# Patient Record
Sex: MALE | Race: WHITE | NOT HISPANIC OR LATINO | Employment: OTHER | URBAN - METROPOLITAN AREA
[De-identification: names, ages, dates, MRNs, and addresses within clinical notes are randomized per-mention and may not be internally consistent; named-entity substitution may affect disease eponyms.]

---

## 2023-03-05 LAB — CALPROTECTIN, STOOL: 45 UG/G

## 2023-08-16 ENCOUNTER — HOSPITAL ENCOUNTER (OUTPATIENT)
Dept: DATA CONVERSION | Facility: HOSPITAL | Age: 68
End: 2023-08-16
Attending: INTERNAL MEDICINE | Admitting: INTERNAL MEDICINE
Payer: MEDICARE

## 2023-08-16 DIAGNOSIS — K64.0 FIRST DEGREE HEMORRHOIDS: ICD-10-CM

## 2023-08-16 DIAGNOSIS — K57.30 DIVERTICULOSIS OF LARGE INTESTINE WITHOUT PERFORATION OR ABSCESS WITHOUT BLEEDING: ICD-10-CM

## 2023-08-16 DIAGNOSIS — K51.30 ULCERATIVE (CHRONIC) RECTOSIGMOIDITIS WITHOUT COMPLICATIONS (MULTI): ICD-10-CM

## 2023-08-16 DIAGNOSIS — Z12.11 ENCOUNTER FOR SCREENING FOR MALIGNANT NEOPLASM OF COLON: ICD-10-CM

## 2023-08-16 DIAGNOSIS — K64.4 RESIDUAL HEMORRHOIDAL SKIN TAGS: ICD-10-CM

## 2023-08-24 LAB
COMPLETE PATHOLOGY REPORT: NORMAL
CONVERTED CLINICAL DIAGNOSIS-HISTORY: NORMAL
CONVERTED FINAL DIAGNOSIS: NORMAL
CONVERTED FINAL REPORT PDF LINK TO COPY AND PASTE: NORMAL
CONVERTED GROSS DESCRIPTION: NORMAL

## 2023-09-29 VITALS
DIASTOLIC BLOOD PRESSURE: 89 MMHG | TEMPERATURE: 96.8 F | RESPIRATION RATE: 18 BRPM | HEART RATE: 71 BPM | SYSTOLIC BLOOD PRESSURE: 138 MMHG

## 2023-09-30 NOTE — H&P
History of Present Illness:   History Present Illness:  Reason for surgery: Ulcerative rectosigmoiditis   HPI:    Patient with known case of ulcerative proctosigmoiditis scheduled for colonoscopy to confirm healing.  Colonoscopy in 2019-left-sided colitis, biopsy chronic active colitis.  Colonoscopy in 2021 colitis involving rectum to sigmoid, biopsy chronic active colitis.  He is on mesalamine      Allergies:        Allergies:  ·  No Known Allergies :     Home Medication Review:   Home Medications Reviewed: yes     Impression/Procedure:   ·  Impression and Planned Procedure: Colonoscopy with biopsy       ERAS (Enhanced Recovery After Surgery):  ·  ERAS Patient: no       Vital Signs:  Temperature C: 36 degrees C   Temperature F: 96.8 degrees F   Heart Rate: 71 beats per minute   Respiratory Rate: 18 breath per minute   Blood Pressure Systolic: 138 mm/Hg   Blood Pressure Diastolic: 89 mm/Hg     Physical Exam by System:    Respiratory/Thorax: Patent airways, CTAB, normal  breath sounds with good chest expansion, thorax symmetric   Cardiovascular: Regular, rate and rhythm, no murmurs,  2+ equal pulses of the extremities, normal S 1and S 2     Consent:   COVID-19 Consent:  ·  COVID-19 Risk Consent Surgeon has reviewed key risks related to the risk of lyndon COVID-19 and if they contract COVID-19 what the risks are.       Electronic Signatures:  Nanette Starkey)  (Signed 16-Aug-2023 08:55)   Authored: History of Present Illness, Allergies, Home  Medication Review, Impression/Procedure, ERAS, Physical Exam, Consent, Note Completion      Last Updated: 16-Aug-2023 08:55 by Nanette Starkey)

## 2023-12-08 PROBLEM — M79.672 ACUTE FOOT PAIN, LEFT: Status: ACTIVE | Noted: 2023-12-08

## 2023-12-08 PROBLEM — K52.9 COLITIS WITHOUT COMPLICATION: Status: ACTIVE | Noted: 2023-12-08

## 2023-12-08 PROBLEM — K62.5 RECTAL HEMORRHAGE: Status: ACTIVE | Noted: 2023-12-08

## 2023-12-08 PROBLEM — M72.2 PLANTAR FASCIITIS: Status: ACTIVE | Noted: 2023-12-08

## 2023-12-08 PROBLEM — R10.9 ABDOMINAL PAIN: Status: ACTIVE | Noted: 2023-12-08

## 2023-12-08 PROBLEM — N40.0 BPH (BENIGN PROSTATIC HYPERPLASIA): Status: ACTIVE | Noted: 2023-12-08

## 2023-12-08 PROBLEM — K51.311 ULCERATIVE RECTOSIGMOIDITIS WITH RECTAL BLEEDING (MULTI): Status: ACTIVE | Noted: 2023-12-08

## 2023-12-08 RX ORDER — PANTOPRAZOLE SODIUM 40 MG/1
1 TABLET, DELAYED RELEASE ORAL DAILY
COMMUNITY

## 2023-12-08 RX ORDER — MESALAMINE 1.2 G/1
3600 TABLET, DELAYED RELEASE ORAL DAILY
COMMUNITY
Start: 2021-07-09 | End: 2024-04-09

## 2023-12-08 RX ORDER — NAPROXEN 250 MG/1
250 TABLET ORAL 2 TIMES DAILY
Status: ON HOLD | COMMUNITY
Start: 2023-06-13 | End: 2024-02-10 | Stop reason: WASHOUT

## 2023-12-08 RX ORDER — TAMSULOSIN HYDROCHLORIDE 0.4 MG/1
1 CAPSULE ORAL DAILY
COMMUNITY
Start: 2019-05-25

## 2023-12-11 ENCOUNTER — OFFICE VISIT (OUTPATIENT)
Dept: PAIN MEDICINE | Facility: CLINIC | Age: 68
End: 2023-12-11
Payer: MEDICARE

## 2023-12-11 ENCOUNTER — ANCILLARY PROCEDURE (OUTPATIENT)
Dept: RADIOLOGY | Facility: CLINIC | Age: 68
End: 2023-12-11
Payer: MEDICARE

## 2023-12-11 VITALS — SYSTOLIC BLOOD PRESSURE: 157 MMHG | DIASTOLIC BLOOD PRESSURE: 60 MMHG | RESPIRATION RATE: 20 BRPM | HEART RATE: 80 BPM

## 2023-12-11 DIAGNOSIS — M25.561 CHRONIC PAIN OF BOTH KNEES: ICD-10-CM

## 2023-12-11 DIAGNOSIS — G89.29 CHRONIC PAIN OF BOTH KNEES: ICD-10-CM

## 2023-12-11 DIAGNOSIS — M25.511 RIGHT SHOULDER PAIN, UNSPECIFIED CHRONICITY: ICD-10-CM

## 2023-12-11 DIAGNOSIS — M25.562 CHRONIC PAIN OF BOTH KNEES: ICD-10-CM

## 2023-12-11 PROCEDURE — 73562 X-RAY EXAM OF KNEE 3: CPT | Mod: BILATERAL PROCEDURE | Performed by: RADIOLOGY

## 2023-12-11 PROCEDURE — 73030 X-RAY EXAM OF SHOULDER: CPT | Mod: RIGHT SIDE | Performed by: RADIOLOGY

## 2023-12-11 PROCEDURE — 1036F TOBACCO NON-USER: CPT | Performed by: ANESTHESIOLOGY

## 2023-12-11 PROCEDURE — 73562 X-RAY EXAM OF KNEE 3: CPT | Mod: 50

## 2023-12-11 PROCEDURE — 99203 OFFICE O/P NEW LOW 30 MIN: CPT | Performed by: ANESTHESIOLOGY

## 2023-12-11 PROCEDURE — 99213 OFFICE O/P EST LOW 20 MIN: CPT | Performed by: ANESTHESIOLOGY

## 2023-12-11 PROCEDURE — 1159F MED LIST DOCD IN RCRD: CPT | Performed by: ANESTHESIOLOGY

## 2023-12-11 PROCEDURE — 1125F AMNT PAIN NOTED PAIN PRSNT: CPT | Performed by: ANESTHESIOLOGY

## 2023-12-11 PROCEDURE — 73030 X-RAY EXAM OF SHOULDER: CPT | Mod: RT,FY

## 2023-12-11 RX ORDER — DEXTROMETHORPHAN HYDROBROMIDE, GUAIFENESIN 5; 100 MG/5ML; MG/5ML
1300 LIQUID ORAL 2 TIMES DAILY PRN
Qty: 120 TABLET | Refills: 2 | Status: SHIPPED | OUTPATIENT
Start: 2023-12-11 | End: 2024-04-09

## 2023-12-11 RX ORDER — DICLOFENAC SODIUM 10 MG/G
4 GEL TOPICAL 4 TIMES DAILY
Qty: 200 G | Refills: 2 | Status: SHIPPED | OUTPATIENT
Start: 2023-12-11

## 2023-12-11 ASSESSMENT — PAIN DESCRIPTION - DESCRIPTORS: DESCRIPTORS: ACHING

## 2023-12-11 ASSESSMENT — ENCOUNTER SYMPTOMS
LOSS OF SENSATION IN FEET: 0
DEPRESSION: 0
OCCASIONAL FEELINGS OF UNSTEADINESS: 0

## 2023-12-11 ASSESSMENT — PAIN - FUNCTIONAL ASSESSMENT: PAIN_FUNCTIONAL_ASSESSMENT: 0-10

## 2023-12-11 ASSESSMENT — PAIN SCALES - GENERAL: PAINLEVEL_OUTOF10: 5 - MODERATE PAIN

## 2023-12-11 NOTE — PROGRESS NOTES
History Of Present Illness  Shakir Arciniega is a 68 y.o. male presenting with bilateral knee pain.  Pain is localized to the medial aspect of the knees and the patient describes it as sharp stabbing in nature.  Factors that x-rayed the pain include standing, walking and going upstairs.  Pain is relieved with rest.  Patient has not had any x-rays of his knees.  Patient has history of ulcerative colitis and is not able to take any nonsteroidals.  Imaging patient is complaining of right shoulder pain secondary to a fall 1 year ago.  Pain localized to the tear aspect of the shoulder and is exacerbated by abduction and elevation.  No x-rays of shoulder available.  Patient is not involved in any physical therapy.  No history of any interventions.     Past Medical History  He has a past medical history of Ulcerative colitis (CMS/[facility]).    Surgical History  He has a past surgical history that includes Knee surgery.     Social History  He reports that he has never smoked. He has never used smokeless tobacco. No history on file for alcohol use and drug use.    Family History  No family history on file.     Allergies  Patient has no known allergies.    Review of systems:   13-point review of systems done and negative except as noted in HPI      Physical Exam   Pertinent findings:   Knees: No swelling or deformity noted.  No erythema.  Normal range of motion with cracking on flexion extension  Negative drawer sign.  Right shoulder: Tenderness on palpation of acromioclavicular joint with deformity.  Normal range of motion    Vital signs: Reviewed  MSK:   No asymmetry or masses noted of the musculature.   Examination of the muscles/joints/bones show normal range of motion.  The patient is mildly tender to palpation in the cervical and lumbar paraspinal musculature.      Neurologic:  Motor strength:   5/5 muscle strength of the upper extremities bilateral and equal.  5/5 muscle strength of the lower extremities bilaterally and equal.      Sensation:   Sensation intact to pin prick in the bilateral upper extremities.  Sensation intact to pin prick in the bilateral lower extremities.     Provocative tests: Negative Diaz sign bilaterally, 2+ patellar reflexes bilaterally.    Psychiatric: Mood and affect are normal.     Last Recorded Vitals  /60   Pulse 80   Resp 20     Relevant Results            Last OARRS Review: No data recorded    I have personally reviewed the OARRS report for Shakir Arciniega I have considered the risks of abuse, dependence, addiction and diversion       Assessment/Plan physical therapy thanks  Diagnoses and all orders for this visit:  Chronic pain of both knees  Right shoulder pain, unspecified chronicity  Mr. Arciniega was evaluated at the pain clinic at [facility] due to to his bilateral knee pain.  Upon evaluation examination of the patient that he is suffering from osteoarthritis of the knees.  I also feel that he has a acromioclavicular deformity secondary to his fall.  I discussed with the patient the etiology of his pain and the options for treatment.  I discussed doing x-rays of the knees and shoulder and schedule for intra-articular steroid injections.  Risk and benefits were discussed and patient was agreeable with the plan.    Plan  Voltaren gel to be applied to both knees  Tylenol arthritis 2 tablets twice daily  Refer patient for physical therapy for quadriceps muscle strengthening  Schedule patient for intra-articular steroid injection for both knees.         Jose Rodriguez MD

## 2023-12-19 ENCOUNTER — TELEPHONE (OUTPATIENT)
Dept: PAIN MEDICINE | Facility: CLINIC | Age: 68
End: 2023-12-19
Payer: MEDICARE

## 2023-12-19 NOTE — TELEPHONE ENCOUNTER
The doctor ordered P.T. for the patient but he states it is too far to drive. Please call and advise. Thanks

## 2024-01-18 ENCOUNTER — LAB (OUTPATIENT)
Dept: LAB | Facility: LAB | Age: 69
End: 2024-01-18
Payer: MEDICARE

## 2024-01-18 DIAGNOSIS — N40.1 BENIGN PROSTATIC HYPERPLASIA WITH LOWER URINARY TRACT SYMPTOMS: Primary | ICD-10-CM

## 2024-01-18 PROCEDURE — 84154 ASSAY OF PSA FREE: CPT

## 2024-01-18 PROCEDURE — 84153 ASSAY OF PSA TOTAL: CPT

## 2024-01-18 PROCEDURE — 36415 COLL VENOUS BLD VENIPUNCTURE: CPT

## 2024-01-19 LAB — PSA SERPL-MCNC: 3.86 NG/ML

## 2024-01-21 LAB
PSA FREE MFR SERPL: 20 %
PSA FREE SERPL-MCNC: 0.9 NG/ML
PSA SERPL IA-MCNC: 4.6 NG/ML (ref 0–4)

## 2024-01-29 ENCOUNTER — OFFICE VISIT (OUTPATIENT)
Dept: PAIN MEDICINE | Facility: CLINIC | Age: 69
End: 2024-01-29
Payer: MEDICARE

## 2024-01-29 VITALS — DIASTOLIC BLOOD PRESSURE: 76 MMHG | HEART RATE: 85 BPM | RESPIRATION RATE: 20 BRPM | SYSTOLIC BLOOD PRESSURE: 148 MMHG

## 2024-01-29 DIAGNOSIS — M25.562 CHRONIC PAIN OF LEFT KNEE: Primary | ICD-10-CM

## 2024-01-29 DIAGNOSIS — G89.29 CHRONIC PAIN OF LEFT KNEE: Primary | ICD-10-CM

## 2024-01-29 PROCEDURE — 1036F TOBACCO NON-USER: CPT | Performed by: ANESTHESIOLOGY

## 2024-01-29 PROCEDURE — 20610 DRAIN/INJ JOINT/BURSA W/O US: CPT | Performed by: ANESTHESIOLOGY

## 2024-01-29 PROCEDURE — 1125F AMNT PAIN NOTED PAIN PRSNT: CPT | Performed by: ANESTHESIOLOGY

## 2024-01-29 PROCEDURE — 1159F MED LIST DOCD IN RCRD: CPT | Performed by: ANESTHESIOLOGY

## 2024-01-29 ASSESSMENT — ENCOUNTER SYMPTOMS
DEPRESSION: 0
OCCASIONAL FEELINGS OF UNSTEADINESS: 0
LOSS OF SENSATION IN FEET: 0

## 2024-01-29 ASSESSMENT — PAIN SCALES - GENERAL: PAINLEVEL_OUTOF10: 9

## 2024-01-29 ASSESSMENT — PAIN - FUNCTIONAL ASSESSMENT: PAIN_FUNCTIONAL_ASSESSMENT: 0-10

## 2024-01-29 ASSESSMENT — PAIN DESCRIPTION - DESCRIPTORS: DESCRIPTORS: ACHING

## 2024-01-29 NOTE — PROGRESS NOTES
Patient ID: Shakir Arciniega is a 68 y.o. male.    Joint Injection/Aspiration    Date/Time: 1/29/2024 1:37 PM    Performed by: Jose Rodriguez MD  Authorized by: Jose Rodriguez MD    Consent:     Consent obtained:  Written    Consent given by:  Patient    Risks, benefits, and alternatives were discussed: yes      Risks discussed:  Bleeding, infection, pain and nerve damage  Universal protocol:     Procedure explained and questions answered to patient or proxy's satisfaction: yes      Relevant documents present and verified: yes      Test results available: yes      Imaging studies available: yes      Required blood products, implants, devices, and special equipment available: yes      Site/side marked: yes      Immediately prior to procedure, a time out was called: yes      Patient identity confirmed:  Verbally with patient  Location:     Location:  Knee  Anesthesia:     Anesthesia method:  Topical application  Procedure details:     Preparation: Patient was prepped and draped in usual sterile fashion      Needle gauge:  22 G    Ultrasound guidance: no      Approach:  Lateral    Steroid injected: yes      Specimen collected: no    Post-procedure details:     Dressing:  Adhesive bandage    Procedure completion:  Tolerated  Comments:      With the patient sitting position the left knee was prepped and draped in sterile fashion.  Using landmark technique and a lateral to medial approach a 25-gauge 1/2 inch needle was introduced into the intra-articular space.  Bupivacaine 0.25% mixed with triamcinolone 40 mg a total of 5 cc was injected.  Patient tolerated the procedure without problem and was discharged home.

## 2024-02-09 ENCOUNTER — APPOINTMENT (OUTPATIENT)
Dept: RADIOLOGY | Facility: HOSPITAL | Age: 69
End: 2024-02-09
Payer: MEDICARE

## 2024-02-09 ENCOUNTER — HOSPITAL ENCOUNTER (EMERGENCY)
Facility: HOSPITAL | Age: 69
Discharge: OTHER INSTITUTION | End: 2024-02-10
Attending: EMERGENCY MEDICINE
Payer: MEDICARE

## 2024-02-09 ENCOUNTER — APPOINTMENT (OUTPATIENT)
Dept: CARDIOLOGY | Facility: HOSPITAL | Age: 69
End: 2024-02-09
Payer: MEDICARE

## 2024-02-09 DIAGNOSIS — K92.2 GASTROINTESTINAL HEMORRHAGE, UNSPECIFIED GASTROINTESTINAL HEMORRHAGE TYPE: Primary | ICD-10-CM

## 2024-02-09 DIAGNOSIS — K51.919 ULCERATIVE COLITIS WITH COMPLICATION, UNSPECIFIED LOCATION (MULTI): ICD-10-CM

## 2024-02-09 LAB
ABO GROUP (TYPE) IN BLOOD: NORMAL
ALBUMIN SERPL BCP-MCNC: 4 G/DL (ref 3.4–5)
ALP SERPL-CCNC: 47 U/L (ref 33–136)
ALT SERPL W P-5'-P-CCNC: 19 U/L (ref 10–52)
ANION GAP BLDV CALCULATED.4IONS-SCNC: 7 MMOL/L (ref 10–25)
ANION GAP SERPL CALC-SCNC: 12 MMOL/L (ref 10–20)
ANTIBODY SCREEN: NORMAL
APTT PPP: 31 SECONDS (ref 27–38)
AST SERPL W P-5'-P-CCNC: 16 U/L (ref 9–39)
BASE EXCESS BLDV CALC-SCNC: 2.8 MMOL/L (ref -2–3)
BASOPHILS # BLD AUTO: 0.05 X10*3/UL (ref 0–0.1)
BASOPHILS NFR BLD AUTO: 0.6 %
BILIRUB SERPL-MCNC: 0.6 MG/DL (ref 0–1.2)
BODY TEMPERATURE: ABNORMAL
BUN SERPL-MCNC: 14 MG/DL (ref 6–23)
CA-I BLDV-SCNC: 1.14 MMOL/L (ref 1.1–1.33)
CALCIUM SERPL-MCNC: 8.6 MG/DL (ref 8.6–10.3)
CHLORIDE BLDV-SCNC: 109 MMOL/L (ref 98–107)
CHLORIDE SERPL-SCNC: 106 MMOL/L (ref 98–107)
CO2 SERPL-SCNC: 27 MMOL/L (ref 21–32)
CREAT SERPL-MCNC: 0.89 MG/DL (ref 0.5–1.3)
EGFRCR SERPLBLD CKD-EPI 2021: >90 ML/MIN/1.73M*2
EOSINOPHIL # BLD AUTO: 0.05 X10*3/UL (ref 0–0.7)
EOSINOPHIL NFR BLD AUTO: 0.6 %
ERYTHROCYTE [DISTWIDTH] IN BLOOD BY AUTOMATED COUNT: 12.9 % (ref 11.5–14.5)
FLUAV RNA RESP QL NAA+PROBE: NOT DETECTED
FLUBV RNA RESP QL NAA+PROBE: NOT DETECTED
GLUCOSE BLDV-MCNC: 107 MG/DL (ref 74–99)
GLUCOSE SERPL-MCNC: 105 MG/DL (ref 74–99)
HCO3 BLDV-SCNC: 27.2 MMOL/L (ref 22–26)
HCT VFR BLD AUTO: 36.3 % (ref 41–52)
HCT VFR BLD EST: 38 % (ref 41–52)
HEMOCCULT SP1 STL QL: POSITIVE
HGB BLD-MCNC: 12.1 G/DL (ref 13.5–17.5)
HGB BLDV-MCNC: 12.5 G/DL (ref 13.5–17.5)
IMM GRANULOCYTES # BLD AUTO: 0.02 X10*3/UL (ref 0–0.7)
IMM GRANULOCYTES NFR BLD AUTO: 0.3 % (ref 0–0.9)
INHALED O2 CONCENTRATION: 0 %
INR PPP: 1 (ref 0.9–1.1)
LACTATE BLDV-SCNC: 1.3 MMOL/L (ref 0.4–2)
LIPASE SERPL-CCNC: 14 U/L (ref 9–82)
LYMPHOCYTES # BLD AUTO: 1.58 X10*3/UL (ref 1.2–4.8)
LYMPHOCYTES NFR BLD AUTO: 20.5 %
MAGNESIUM SERPL-MCNC: 1.97 MG/DL (ref 1.6–2.4)
MCH RBC QN AUTO: 30.6 PG (ref 26–34)
MCHC RBC AUTO-ENTMCNC: 33.3 G/DL (ref 32–36)
MCV RBC AUTO: 92 FL (ref 80–100)
MONOCYTES # BLD AUTO: 0.78 X10*3/UL (ref 0.1–1)
MONOCYTES NFR BLD AUTO: 10.1 %
NEUTROPHILS # BLD AUTO: 5.24 X10*3/UL (ref 1.2–7.7)
NEUTROPHILS NFR BLD AUTO: 67.9 %
NRBC BLD-RTO: 0 /100 WBCS (ref 0–0)
OXYHGB MFR BLDV: 81.6 % (ref 45–75)
PCO2 BLDV: 40 MM HG (ref 41–51)
PH BLDV: 7.44 PH (ref 7.33–7.43)
PHOSPHATE SERPL-MCNC: 3.3 MG/DL (ref 2.5–4.9)
PLATELET # BLD AUTO: 237 X10*3/UL (ref 150–450)
PO2 BLDV: 51 MM HG (ref 35–45)
POTASSIUM BLDV-SCNC: 4 MMOL/L (ref 3.5–5.3)
POTASSIUM SERPL-SCNC: 4 MMOL/L (ref 3.5–5.3)
PROT SERPL-MCNC: 6.4 G/DL (ref 6.4–8.2)
PROTHROMBIN TIME: 11.6 SECONDS (ref 9.8–12.8)
RBC # BLD AUTO: 3.95 X10*6/UL (ref 4.5–5.9)
RH FACTOR (ANTIGEN D): NORMAL
SAO2 % BLDV: 85 % (ref 45–75)
SARS-COV-2 RNA RESP QL NAA+PROBE: NOT DETECTED
SODIUM BLDV-SCNC: 139 MMOL/L (ref 136–145)
SODIUM SERPL-SCNC: 141 MMOL/L (ref 136–145)
WBC # BLD AUTO: 7.7 X10*3/UL (ref 4.4–11.3)

## 2024-02-09 PROCEDURE — 83605 ASSAY OF LACTIC ACID: CPT | Mod: 91 | Performed by: STUDENT IN AN ORGANIZED HEALTH CARE EDUCATION/TRAINING PROGRAM

## 2024-02-09 PROCEDURE — 74177 CT ABD & PELVIS W/CONTRAST: CPT

## 2024-02-09 PROCEDURE — 84100 ASSAY OF PHOSPHORUS: CPT | Performed by: STUDENT IN AN ORGANIZED HEALTH CARE EDUCATION/TRAINING PROGRAM

## 2024-02-09 PROCEDURE — 71045 X-RAY EXAM CHEST 1 VIEW: CPT

## 2024-02-09 PROCEDURE — 83690 ASSAY OF LIPASE: CPT | Performed by: STUDENT IN AN ORGANIZED HEALTH CARE EDUCATION/TRAINING PROGRAM

## 2024-02-09 PROCEDURE — 74177 CT ABD & PELVIS W/CONTRAST: CPT | Performed by: SURGERY

## 2024-02-09 PROCEDURE — 85730 THROMBOPLASTIN TIME PARTIAL: CPT | Mod: 91 | Performed by: STUDENT IN AN ORGANIZED HEALTH CARE EDUCATION/TRAINING PROGRAM

## 2024-02-09 PROCEDURE — 87636 SARSCOV2 & INF A&B AMP PRB: CPT | Performed by: STUDENT IN AN ORGANIZED HEALTH CARE EDUCATION/TRAINING PROGRAM

## 2024-02-09 PROCEDURE — 36415 COLL VENOUS BLD VENIPUNCTURE: CPT | Performed by: STUDENT IN AN ORGANIZED HEALTH CARE EDUCATION/TRAINING PROGRAM

## 2024-02-09 PROCEDURE — 93005 ELECTROCARDIOGRAM TRACING: CPT

## 2024-02-09 PROCEDURE — 2550000001 HC RX 255 CONTRASTS: Performed by: EMERGENCY MEDICINE

## 2024-02-09 PROCEDURE — 82270 OCCULT BLOOD FECES: CPT | Performed by: STUDENT IN AN ORGANIZED HEALTH CARE EDUCATION/TRAINING PROGRAM

## 2024-02-09 PROCEDURE — 84132 ASSAY OF SERUM POTASSIUM: CPT | Mod: 59,91 | Performed by: STUDENT IN AN ORGANIZED HEALTH CARE EDUCATION/TRAINING PROGRAM

## 2024-02-09 PROCEDURE — 99285 EMERGENCY DEPT VISIT HI MDM: CPT | Mod: 25 | Performed by: EMERGENCY MEDICINE

## 2024-02-09 PROCEDURE — 85025 COMPLETE CBC W/AUTO DIFF WBC: CPT | Performed by: STUDENT IN AN ORGANIZED HEALTH CARE EDUCATION/TRAINING PROGRAM

## 2024-02-09 PROCEDURE — 86900 BLOOD TYPING SEROLOGIC ABO: CPT | Mod: 91 | Performed by: STUDENT IN AN ORGANIZED HEALTH CARE EDUCATION/TRAINING PROGRAM

## 2024-02-09 PROCEDURE — 83735 ASSAY OF MAGNESIUM: CPT | Performed by: STUDENT IN AN ORGANIZED HEALTH CARE EDUCATION/TRAINING PROGRAM

## 2024-02-09 RX ADMIN — IOHEXOL 75 ML: 350 INJECTION, SOLUTION INTRAVENOUS at 21:04

## 2024-02-09 ASSESSMENT — PAIN DESCRIPTION - PAIN TYPE: TYPE: ACUTE PAIN

## 2024-02-09 ASSESSMENT — LIFESTYLE VARIABLES
EVER FELT BAD OR GUILTY ABOUT YOUR DRINKING: NO
HAVE PEOPLE ANNOYED YOU BY CRITICIZING YOUR DRINKING: NO
EVER HAD A DRINK FIRST THING IN THE MORNING TO STEADY YOUR NERVES TO GET RID OF A HANGOVER: NO
HAVE YOU EVER FELT YOU SHOULD CUT DOWN ON YOUR DRINKING: NO

## 2024-02-09 ASSESSMENT — PAIN SCALES - GENERAL: PAINLEVEL_OUTOF10: 0 - NO PAIN

## 2024-02-09 ASSESSMENT — PAIN DESCRIPTION - PROGRESSION: CLINICAL_PROGRESSION: NOT CHANGED

## 2024-02-09 ASSESSMENT — PAIN - FUNCTIONAL ASSESSMENT: PAIN_FUNCTIONAL_ASSESSMENT: 0-10

## 2024-02-10 ENCOUNTER — HOSPITAL ENCOUNTER (INPATIENT)
Facility: HOSPITAL | Age: 69
LOS: 2 days | Discharge: HOME | DRG: 386 | End: 2024-02-12
Attending: INTERNAL MEDICINE | Admitting: INTERNAL MEDICINE
Payer: MEDICARE

## 2024-02-10 VITALS
OXYGEN SATURATION: 98 % | HEART RATE: 83 BPM | TEMPERATURE: 97.2 F | SYSTOLIC BLOOD PRESSURE: 121 MMHG | RESPIRATION RATE: 16 BRPM | DIASTOLIC BLOOD PRESSURE: 78 MMHG

## 2024-02-10 DIAGNOSIS — R10.84 GENERALIZED ABDOMINAL PAIN: ICD-10-CM

## 2024-02-10 DIAGNOSIS — K92.2 LOWER GI BLEED: Primary | ICD-10-CM

## 2024-02-10 LAB
ERYTHROCYTE [DISTWIDTH] IN BLOOD BY AUTOMATED COUNT: 13 % (ref 11.5–14.5)
HCT VFR BLD AUTO: 31.9 % (ref 41–52)
HGB BLD-MCNC: 10.2 G/DL (ref 13.5–17.5)
HOLD SPECIMEN: NORMAL
MCH RBC QN AUTO: 29.5 PG (ref 26–34)
MCHC RBC AUTO-ENTMCNC: 32 G/DL (ref 32–36)
MCV RBC AUTO: 92 FL (ref 80–100)
NRBC BLD-RTO: 0 /100 WBCS (ref 0–0)
PLATELET # BLD AUTO: 222 X10*3/UL (ref 150–450)
RBC # BLD AUTO: 3.46 X10*6/UL (ref 4.5–5.9)
WBC # BLD AUTO: 6.8 X10*3/UL (ref 4.4–11.3)

## 2024-02-10 PROCEDURE — 2500000004 HC RX 250 GENERAL PHARMACY W/ HCPCS (ALT 636 FOR OP/ED): Performed by: INTERNAL MEDICINE

## 2024-02-10 PROCEDURE — 87506 IADNA-DNA/RNA PROBE TQ 6-11: CPT | Mod: AHULAB | Performed by: INTERNAL MEDICINE

## 2024-02-10 PROCEDURE — C9113 INJ PANTOPRAZOLE SODIUM, VIA: HCPCS | Performed by: INTERNAL MEDICINE

## 2024-02-10 PROCEDURE — 2500000002 HC RX 250 W HCPCS SELF ADMINISTERED DRUGS (ALT 637 FOR MEDICARE OP, ALT 636 FOR OP/ED): Performed by: INTERNAL MEDICINE

## 2024-02-10 PROCEDURE — 1200000002 HC GENERAL ROOM WITH TELEMETRY DAILY

## 2024-02-10 PROCEDURE — 85027 COMPLETE CBC AUTOMATED: CPT | Performed by: INTERNAL MEDICINE

## 2024-02-10 PROCEDURE — 87493 C DIFF AMPLIFIED PROBE: CPT | Mod: AHULAB | Performed by: INTERNAL MEDICINE

## 2024-02-10 PROCEDURE — 99222 1ST HOSP IP/OBS MODERATE 55: CPT | Performed by: INTERNAL MEDICINE

## 2024-02-10 PROCEDURE — 2500000001 HC RX 250 WO HCPCS SELF ADMINISTERED DRUGS (ALT 637 FOR MEDICARE OP): Performed by: INTERNAL MEDICINE

## 2024-02-10 PROCEDURE — 36415 COLL VENOUS BLD VENIPUNCTURE: CPT | Performed by: INTERNAL MEDICINE

## 2024-02-10 RX ORDER — TALC
3 POWDER (GRAM) TOPICAL DAILY
Status: DISCONTINUED | OUTPATIENT
Start: 2024-02-10 | End: 2024-02-12 | Stop reason: HOSPADM

## 2024-02-10 RX ORDER — ACETAMINOPHEN 650 MG/1
650 SUPPOSITORY RECTAL EVERY 4 HOURS PRN
Status: DISCONTINUED | OUTPATIENT
Start: 2024-02-10 | End: 2024-02-12 | Stop reason: HOSPADM

## 2024-02-10 RX ORDER — MESALAMINE 1.2 G/1
3.6 TABLET, DELAYED RELEASE ORAL
Status: DISCONTINUED | OUTPATIENT
Start: 2024-02-10 | End: 2024-02-12 | Stop reason: HOSPADM

## 2024-02-10 RX ORDER — ONDANSETRON 4 MG/1
4 TABLET, FILM COATED ORAL EVERY 8 HOURS PRN
Status: DISCONTINUED | OUTPATIENT
Start: 2024-02-10 | End: 2024-02-12 | Stop reason: HOSPADM

## 2024-02-10 RX ORDER — MESALAMINE 1.2 G/1
1.2 TABLET, DELAYED RELEASE ORAL
Status: DISCONTINUED | OUTPATIENT
Start: 2024-02-10 | End: 2024-02-10

## 2024-02-10 RX ORDER — ACETAMINOPHEN 325 MG/1
650 TABLET ORAL EVERY 4 HOURS PRN
Status: DISCONTINUED | OUTPATIENT
Start: 2024-02-10 | End: 2024-02-12 | Stop reason: HOSPADM

## 2024-02-10 RX ORDER — TAMSULOSIN HYDROCHLORIDE 0.4 MG/1
0.4 CAPSULE ORAL DAILY
Status: DISCONTINUED | OUTPATIENT
Start: 2024-02-10 | End: 2024-02-12 | Stop reason: HOSPADM

## 2024-02-10 RX ORDER — DICLOFENAC SODIUM 10 MG/G
4 GEL TOPICAL 4 TIMES DAILY PRN
Status: DISCONTINUED | OUTPATIENT
Start: 2024-02-10 | End: 2024-02-12 | Stop reason: HOSPADM

## 2024-02-10 RX ORDER — ONDANSETRON HYDROCHLORIDE 2 MG/ML
4 INJECTION, SOLUTION INTRAVENOUS EVERY 8 HOURS PRN
Status: DISCONTINUED | OUTPATIENT
Start: 2024-02-10 | End: 2024-02-12 | Stop reason: HOSPADM

## 2024-02-10 RX ORDER — PANTOPRAZOLE SODIUM 40 MG/1
40 TABLET, DELAYED RELEASE ORAL
Status: DISCONTINUED | OUTPATIENT
Start: 2024-02-10 | End: 2024-02-12 | Stop reason: HOSPADM

## 2024-02-10 RX ORDER — PANTOPRAZOLE SODIUM 40 MG/10ML
40 INJECTION, POWDER, LYOPHILIZED, FOR SOLUTION INTRAVENOUS
Status: DISCONTINUED | OUTPATIENT
Start: 2024-02-10 | End: 2024-02-12 | Stop reason: HOSPADM

## 2024-02-10 RX ORDER — SODIUM CHLORIDE 9 MG/ML
100 INJECTION, SOLUTION INTRAVENOUS CONTINUOUS
Status: DISCONTINUED | OUTPATIENT
Start: 2024-02-10 | End: 2024-02-12 | Stop reason: HOSPADM

## 2024-02-10 RX ORDER — ACETAMINOPHEN 160 MG/5ML
650 SOLUTION ORAL EVERY 4 HOURS PRN
Status: DISCONTINUED | OUTPATIENT
Start: 2024-02-10 | End: 2024-02-12 | Stop reason: HOSPADM

## 2024-02-10 RX ADMIN — Medication 3 MG: at 23:28

## 2024-02-10 RX ADMIN — SODIUM CHLORIDE 100 ML/HR: 9 INJECTION, SOLUTION INTRAVENOUS at 17:52

## 2024-02-10 RX ADMIN — METHYLPREDNISOLONE SODIUM SUCCINATE 40 MG: 40 INJECTION, POWDER, FOR SOLUTION INTRAMUSCULAR; INTRAVENOUS at 09:17

## 2024-02-10 RX ADMIN — PANTOPRAZOLE SODIUM 40 MG: 40 INJECTION, POWDER, FOR SOLUTION INTRAVENOUS at 06:23

## 2024-02-10 RX ADMIN — MESALAMINE 3.6 G: 1.2 TABLET, DELAYED RELEASE ORAL at 17:48

## 2024-02-10 RX ADMIN — SODIUM CHLORIDE 100 ML/HR: 9 INJECTION, SOLUTION INTRAVENOUS at 06:23

## 2024-02-10 RX ADMIN — TAMSULOSIN HYDROCHLORIDE 0.4 MG: 0.4 CAPSULE ORAL at 09:15

## 2024-02-10 SDOH — SOCIAL STABILITY: SOCIAL INSECURITY: HAVE YOU HAD THOUGHTS OF HARMING ANYONE ELSE?: NO

## 2024-02-10 SDOH — SOCIAL STABILITY: SOCIAL INSECURITY: WERE YOU ABLE TO COMPLETE ALL THE BEHAVIORAL HEALTH SCREENINGS?: YES

## 2024-02-10 SDOH — SOCIAL STABILITY: SOCIAL INSECURITY: DO YOU FEEL UNSAFE GOING BACK TO THE PLACE WHERE YOU ARE LIVING?: NO

## 2024-02-10 SDOH — SOCIAL STABILITY: SOCIAL INSECURITY: ABUSE: ADULT

## 2024-02-10 SDOH — SOCIAL STABILITY: SOCIAL INSECURITY: DO YOU FEEL ANYONE HAS EXPLOITED OR TAKEN ADVANTAGE OF YOU FINANCIALLY OR OF YOUR PERSONAL PROPERTY?: NO

## 2024-02-10 SDOH — SOCIAL STABILITY: SOCIAL INSECURITY: ARE THERE ANY APPARENT SIGNS OF INJURIES/BEHAVIORS THAT COULD BE RELATED TO ABUSE/NEGLECT?: NO

## 2024-02-10 SDOH — SOCIAL STABILITY: SOCIAL INSECURITY: ARE YOU OR HAVE YOU BEEN THREATENED OR ABUSED PHYSICALLY, EMOTIONALLY, OR SEXUALLY BY ANYONE?: NO

## 2024-02-10 SDOH — SOCIAL STABILITY: SOCIAL INSECURITY: DOES ANYONE TRY TO KEEP YOU FROM HAVING/CONTACTING OTHER FRIENDS OR DOING THINGS OUTSIDE YOUR HOME?: NO

## 2024-02-10 SDOH — SOCIAL STABILITY: SOCIAL INSECURITY: HAS ANYONE EVER THREATENED TO HURT YOUR FAMILY OR YOUR PETS?: NO

## 2024-02-10 ASSESSMENT — COGNITIVE AND FUNCTIONAL STATUS - GENERAL
MOBILITY SCORE: 24
DAILY ACTIVITIY SCORE: 24
MOBILITY SCORE: 24
DAILY ACTIVITIY SCORE: 24
DAILY ACTIVITIY SCORE: 24
MOBILITY SCORE: 24
DAILY ACTIVITIY SCORE: 24
MOBILITY SCORE: 24
PATIENT BASELINE BEDBOUND: NO
MOBILITY SCORE: 24
DAILY ACTIVITIY SCORE: 24

## 2024-02-10 ASSESSMENT — ENCOUNTER SYMPTOMS
NEUROLOGICAL NEGATIVE: 1
BLOOD IN STOOL: 1
ANAL BLEEDING: 1
RESPIRATORY NEGATIVE: 1
APPETITE CHANGE: 1
CARDIOVASCULAR NEGATIVE: 1
MUSCULOSKELETAL NEGATIVE: 1
ALLERGIC/IMMUNOLOGIC NEGATIVE: 1
PSYCHIATRIC NEGATIVE: 1
EYES NEGATIVE: 1
ENDOCRINE NEGATIVE: 1
DIARRHEA: 1
HEMATOLOGIC/LYMPHATIC NEGATIVE: 1
ABDOMINAL PAIN: 1

## 2024-02-10 ASSESSMENT — ACTIVITIES OF DAILY LIVING (ADL)
JUDGMENT_ADEQUATE_SAFELY_COMPLETE_DAILY_ACTIVITIES: YES
DRESSING YOURSELF: INDEPENDENT
TOILETING: INDEPENDENT
GROOMING: INDEPENDENT
WALKS IN HOME: INDEPENDENT
FEEDING YOURSELF: INDEPENDENT
LACK_OF_TRANSPORTATION: NO
HEARING - RIGHT EAR: FUNCTIONAL
BATHING: INDEPENDENT
ADEQUATE_TO_COMPLETE_ADL: YES
PATIENT'S MEMORY ADEQUATE TO SAFELY COMPLETE DAILY ACTIVITIES?: YES
HEARING - LEFT EAR: FUNCTIONAL

## 2024-02-10 ASSESSMENT — LIFESTYLE VARIABLES
AUDIT-C TOTAL SCORE: 0
SKIP TO QUESTIONS 9-10: 1
AUDIT-C TOTAL SCORE: 0
HOW OFTEN DO YOU HAVE A DRINK CONTAINING ALCOHOL: NEVER
HOW OFTEN DO YOU HAVE 6 OR MORE DRINKS ON ONE OCCASION: NEVER
HOW MANY STANDARD DRINKS CONTAINING ALCOHOL DO YOU HAVE ON A TYPICAL DAY: PATIENT DOES NOT DRINK

## 2024-02-10 ASSESSMENT — COLUMBIA-SUICIDE SEVERITY RATING SCALE - C-SSRS
6. HAVE YOU EVER DONE ANYTHING, STARTED TO DO ANYTHING, OR PREPARED TO DO ANYTHING TO END YOUR LIFE?: NO
2. HAVE YOU ACTUALLY HAD ANY THOUGHTS OF KILLING YOURSELF?: NO
1. IN THE PAST MONTH, HAVE YOU WISHED YOU WERE DEAD OR WISHED YOU COULD GO TO SLEEP AND NOT WAKE UP?: NO

## 2024-02-10 ASSESSMENT — PATIENT HEALTH QUESTIONNAIRE - PHQ9
SUM OF ALL RESPONSES TO PHQ9 QUESTIONS 1 & 2: 0
1. LITTLE INTEREST OR PLEASURE IN DOING THINGS: NOT AT ALL
2. FEELING DOWN, DEPRESSED OR HOPELESS: NOT AT ALL

## 2024-02-10 ASSESSMENT — PAIN - FUNCTIONAL ASSESSMENT
PAIN_FUNCTIONAL_ASSESSMENT: 0-10

## 2024-02-10 ASSESSMENT — PAIN SCALES - GENERAL
PAINLEVEL_OUTOF10: 0 - NO PAIN

## 2024-02-10 NOTE — CONSULTS
Reason For Consult  Ulcer colitis flare    History Of Present Illness  Shakir Arciniega is a 68 y.o. male presenting with 2-day history of rectal bleeding and diarrhea.  Has prior history of ulcerative colitis diagnosed in 2019.  Has been following with Dr. Marcos and most recently with Dr Starkey.  Patient underwent colonoscopy last in August 2023 showing colitis in remission.  He is managed chronically on mesalamine therapy.  Reports after eating pork 2 days ago started experiencing diarrhea and then rectal bleeding.  Does not recall any recent flares otherwise since his diagnosis.  No fevers.  No nausea or vomiting.     Past Medical History  He has a past medical history of Ulcerative colitis (CMS/[facility]).    Surgical History  He has a past surgical history that includes Knee surgery.     Social History  He reports that he has never smoked. He has never used smokeless tobacco. No history on file for alcohol use and drug use.    Family History  No family history on file.     Allergies  Patient has no known allergies.    Review of Systems  CONSTITUTIONAL: negative for fever, chills, fatigue, or unintentional weight loss,   HEENT: negative for icteric sclera, eye pain/redness, or changes in vision/hearing  RESPIRATORY: negative for cough, hemoptysis, wheezing, orthopnea, or dyspnea on exertion  CARDIOVASCULAR: negative for chest pain, palpitations, or syncope   GASTROINTESTINAL: as noted per HPI  GENITOURINARY: negative for dysuria, polyuria, incontinence, or hematuria  MUSCULOSKELETAL: negative for arthralgia, myalgia, or joint swelling/stiffness   INTEGUMENTARY/SKIN: negative jaundice, rash, or skin lesion  HEMATOLOGIC/LYMPHATIC: negative for prolonged bleeding, easy bruising, or swollen lymph nodes  ENDOCRINE: negative for cold/heat intolerance, polydipsia, polyuria, or goiter  NEUROLOGIC: negative for headaches, dizziness, tremor, or gait abnormality  PSYCHIATRIC: negative for anxiety, depression, personality changes, or  "sleep disturbances       Physical Exam  CONSTITUTIONAL: no acute distress, appears stated age, well-appearing  EYES: anicteric sclera, sclera clear, no conjunctival pallor  HEAD: normocephalic, atraumatic   NECK: supple   PULMONARY: clear to auscultation bilaterally, no increased work of breathing   CARDIOVASCULAR: regular rate and rhythm, no murmurs/rubs/gallops appreciated  ABDOMEN: soft, non-tender, positive bowel sounds, no rebound or guarding, no appreciable hepatosplenomegaly  LYMPHATIC: no palpable lymphadenopathy in the neck  MUSCULOSKELETAL: normal gait, no cyanosis/clubbing/edema  SKIN: no jaundice or visible rash  NEUROLOGIC: no gross motor deficits, cranial nerves grossly intact  PSYCHIATRIC: alert and oriented to person/place/time, appropriate insight and judgement         Last Recorded Vitals  Blood pressure 137/76, pulse 69, temperature 36.4 °C (97.6 °F), resp. rate 17, height 1.676 m (5' 5.98\"), weight 87.1 kg (192 lb 0.3 oz), SpO2 96 %.    Relevant Results      Scheduled medications  melatonin, 3 mg, oral, Daily  mesalamine, 3.6 g, oral, Daily with evening meal  methylPREDNISolone sodium succinate (PF), 40 mg, intravenous, q24h  pantoprazole, 40 mg, oral, Daily before breakfast   Or  pantoprazole, 40 mg, intravenous, Daily before breakfast  tamsulosin, 0.4 mg, oral, Daily      Continuous medications  sodium chloride 0.9%, 100 mL/hr, Last Rate: 100 mL/hr (02/10/24 0631)      PRN medications  PRN medications: acetaminophen **OR** acetaminophen **OR** acetaminophen, diclofenac sodium, ondansetron **OR** ondansetron, ondansetron **OR** ondansetron  Results for orders placed or performed during the hospital encounter of 02/10/24 (from the past 24 hour(s))   CBC   Result Value Ref Range    WBC 6.8 4.4 - 11.3 x10*3/uL    nRBC 0.0 0.0 - 0.0 /100 WBCs    RBC 3.46 (L) 4.50 - 5.90 x10*6/uL    Hemoglobin 10.2 (L) 13.5 - 17.5 g/dL    Hematocrit 31.9 (L) 41.0 - 52.0 %    MCV 92 80 - 100 fL    MCH 29.5 26.0 - 34.0 " pg    MCHC 32.0 32.0 - 36.0 g/dL    RDW 13.0 11.5 - 14.5 %    Platelets 222 150 - 450 x10*3/uL          Assessment/Plan     Patient admitted with diarrhea and rectal bleeding consistent with ulcerative colitis flare.  Possibly secondary to infection.  Recommend checking stool studies including C. difficile and stool pathogen profile.  Okay with starting steroid therapy at this time.  Patient had no evidence of active colitis on colonoscopy 6-month ago and appears to be in remission.  Will follow response and also follow-up stool studies.        Ez Aquino MD

## 2024-02-10 NOTE — CARE PLAN
The patient's goals for the shift include      The clinical goals for the shift include Patient will remain comfortable throughout the shift.    Over the shift, the patient did not make progress toward the following goals. Barriers to progression include GI bleed. Recommendations to address these barriers include monitor all stools.

## 2024-02-10 NOTE — H&P
History Of Present Illness  Shakir Arciniega is a 68 y.o. male with a past medical history of ulcerative colitis on mesalamine who presented to the emergency room at [facility] complaining of rectal bleeding and diarrhea for the past 2 days.  He says that he ate some greasy pork around 3 days ago that believes may have kicked off the bloody diarrhea.  Initially the diarrhea was bright red blood today the diarrhea has turned a mixed brownish color along with pruritus diarrhea.  The frequency of the diarrhea has increased as well.  He had a colonoscopy that was satisfactory in August 2023.  It showed that his ulcerative colitis was in remission.  He also has crampy diffuse abdominal pain but denies any fever or chills chest pain shortness of breath.  His hemoglobin at [facility] was 12.1     Past Medical History  Past Medical History:   Diagnosis Date    Ulcerative colitis (CMS/[facility])         Surgical History  Past Surgical History:   Procedure Laterality Date    KNEE SURGERY           Social History  He reports that he has never smoked. He has never used smokeless tobacco. No history on file for alcohol use and drug use.    Family History  No family history on file.     Allergies  Patient has no known allergies.    Review of Systems   Constitutional:  Positive for appetite change.   HENT: Negative.     Eyes: Negative.    Respiratory: Negative.     Cardiovascular: Negative.    Gastrointestinal:  Positive for abdominal pain, anal bleeding, blood in stool and diarrhea.   Endocrine: Negative.    Genitourinary: Negative.    Musculoskeletal: Negative.    Skin: Negative.    Allergic/Immunologic: Negative.    Neurological: Negative.    Hematological: Negative.    Psychiatric/Behavioral: Negative.     All other systems reviewed and are negative.       Physical Exam  Vitals and nursing note reviewed.   Constitutional:       Appearance: He is obese. He is ill-appearing.   HENT:      Head: Normocephalic.      Right  "Ear: External ear normal.      Left Ear: External ear normal.      Nose: Nose normal.      Mouth/Throat:      Mouth: Mucous membranes are dry.      Pharynx: Oropharynx is clear.   Eyes:      Extraocular Movements: Extraocular movements intact.      Conjunctiva/sclera: Conjunctivae normal.      Pupils: Pupils are equal, round, and reactive to light.   Cardiovascular:      Rate and Rhythm: Normal rate and regular rhythm.   Pulmonary:      Effort: Pulmonary effort is normal.      Breath sounds: Normal breath sounds.   Abdominal:      General: Abdomen is flat. Bowel sounds are normal.      Palpations: Abdomen is soft.      Tenderness: There is abdominal tenderness.   Musculoskeletal:         General: Normal range of motion.   Skin:     General: Skin is warm and dry.   Neurological:      General: No focal deficit present.      Mental Status: He is alert. Mental status is at baseline.   Psychiatric:         Mood and Affect: Mood normal.         Behavior: Behavior normal.          Last Recorded Vitals  Blood pressure 135/75, pulse 71, temperature 36.5 °C (97.7 °F), temperature source Temporal, resp. rate 16, height 1.676 m (5' 5.98\"), weight 87.1 kg (192 lb 0.3 oz), SpO2 97 %.    Relevant Results  Meds:  Scheduled medications  melatonin, 3 mg, oral, Daily  mesalamine, 1.2 g, oral, Daily with breakfast  pantoprazole, 40 mg, oral, Daily before breakfast   Or  pantoprazole, 40 mg, intravenous, Daily before breakfast  tamsulosin, 0.4 mg, oral, Daily      Continuous medications  sodium chloride 0.9%, 100 mL/hr      PRN medications  PRN medications: acetaminophen **OR** acetaminophen **OR** acetaminophen, diclofenac sodium, ondansetron **OR** ondansetron, ondansetron **OR** ondansetron   Current Outpatient Medications   Medication Instructions    acetaminophen (TYLENOL 8 HOUR) 1,300 mg, oral, 2 times daily PRN, Do not crush, chew, or split.    diclofenac sodium (Voltaren) 1 % gel gel 1 Application, Topical, 4 times daily, Bilat " knee    mesalamine (Lialda) 1.2 gram EC tablet oral, Daily RT    pantoprazole (ProtoNix) 40 mg EC tablet 1 tablet, oral, Daily    tamsulosin (Flomax) 0.4 mg 24 hr capsule 1 capsule, oral, Daily        Labs:  Results for orders placed or performed during the hospital encounter of 02/09/24 (from the past 24 hour(s))   Occult Blood, Stool    Specimen: Stool   Result Value Ref Range    Occult Blood, Stool X1 Positive (A) Negative   CBC and Auto Differential   Result Value Ref Range    WBC 7.7 4.4 - 11.3 x10*3/uL    nRBC 0.0 0.0 - 0.0 /100 WBCs    RBC 3.95 (L) 4.50 - 5.90 x10*6/uL    Hemoglobin 12.1 (L) 13.5 - 17.5 g/dL    Hematocrit 36.3 (L) 41.0 - 52.0 %    MCV 92 80 - 100 fL    MCH 30.6 26.0 - 34.0 pg    MCHC 33.3 32.0 - 36.0 g/dL    RDW 12.9 11.5 - 14.5 %    Platelets 237 150 - 450 x10*3/uL    Neutrophils % 67.9 40.0 - 80.0 %    Immature Granulocytes %, Automated 0.3 0.0 - 0.9 %    Lymphocytes % 20.5 13.0 - 44.0 %    Monocytes % 10.1 2.0 - 10.0 %    Eosinophils % 0.6 0.0 - 6.0 %    Basophils % 0.6 0.0 - 2.0 %    Neutrophils Absolute 5.24 1.20 - 7.70 x10*3/uL    Immature Granulocytes Absolute, Automated 0.02 0.00 - 0.70 x10*3/uL    Lymphocytes Absolute 1.58 1.20 - 4.80 x10*3/uL    Monocytes Absolute 0.78 0.10 - 1.00 x10*3/uL    Eosinophils Absolute 0.05 0.00 - 0.70 x10*3/uL    Basophils Absolute 0.05 0.00 - 0.10 x10*3/uL   Magnesium   Result Value Ref Range    Magnesium 1.97 1.60 - 2.40 mg/dL   Comprehensive metabolic panel   Result Value Ref Range    Glucose 105 (H) 74 - 99 mg/dL    Sodium 141 136 - 145 mmol/L    Potassium 4.0 3.5 - 5.3 mmol/L    Chloride 106 98 - 107 mmol/L    Bicarbonate 27 21 - 32 mmol/L    Anion Gap 12 10 - 20 mmol/L    Urea Nitrogen 14 6 - 23 mg/dL    Creatinine 0.89 0.50 - 1.30 mg/dL    eGFR >90 >60 mL/min/1.73m*2    Calcium 8.6 8.6 - 10.3 mg/dL    Albumin 4.0 3.4 - 5.0 g/dL    Alkaline Phosphatase 47 33 - 136 U/L    Total Protein 6.4 6.4 - 8.2 g/dL    AST 16 9 - 39 U/L    Bilirubin, Total 0.6  0.0 - 1.2 mg/dL    ALT 19 10 - 52 U/L   Lipase   Result Value Ref Range    Lipase 14 9 - 82 U/L   Phosphorus   Result Value Ref Range    Phosphorus 3.3 2.5 - 4.9 mg/dL   Blood Gas Venous Full Panel   Result Value Ref Range    POCT pH, Venous 7.44 (H) 7.33 - 7.43 pH    POCT pCO2, Venous 40 (L) 41 - 51 mm Hg    POCT pO2, Venous 51 (H) 35 - 45 mm Hg    POCT SO2, Venous 85 (H) 45 - 75 %    POCT Oxy Hemoglobin, Venous 81.6 (H) 45.0 - 75.0 %    POCT Hematocrit Calculated, Venous 38.0 (L) 41.0 - 52.0 %    POCT Sodium, Venous 139 136 - 145 mmol/L    POCT Potassium, Venous 4.0 3.5 - 5.3 mmol/L    POCT Chloride, Venous 109 (H) 98 - 107 mmol/L    POCT Ionized Calicum, Venous 1.14 1.10 - 1.33 mmol/L    POCT Glucose, Venous 107 (H) 74 - 99 mg/dL    POCT Lactate, Venous 1.3 0.4 - 2.0 mmol/L    POCT Base Excess, Venous 2.8 -2.0 - 3.0 mmol/L    POCT HCO3 Calculated, Venous 27.2 (H) 22.0 - 26.0 mmol/L    POCT Hemoglobin, Venous 12.5 (L) 13.5 - 17.5 g/dL    POCT Anion Gap, Venous 7.0 (L) 10.0 - 25.0 mmol/L    Patient Temperature      FiO2 0 %   Influenza A, and B PCR   Result Value Ref Range    Flu A Result Not Detected Not Detected    Flu B Result Not Detected Not Detected   Sars-CoV-2 PCR   Result Value Ref Range    Coronavirus 2019, PCR Not Detected Not Detected   Coagulation Screen   Result Value Ref Range    Protime 11.6 9.8 - 12.8 seconds    INR 1.0 0.9 - 1.1    aPTT 31 27 - 38 seconds   Type And Screen   Result Value Ref Range    ABO TYPE A     Rh TYPE POS     ANTIBODY SCREEN NEG       Imaging:  CT abdomen pelvis w IV contrast    Result Date: 2/9/2024  Interpreted By:  Milad Jacques, STUDY: CT ABDOMEN PELVIS W IV CONTRAST; ;  2/9/2024 9:02 pm   INDICATION: Signs/Symptoms:ulcerative colitis with abdominal pain and rectal bleeding.   COMPARISON: None.   ACCESSION NUMBER(S): [id number]   ORDERING CLINICIAN: CARISA MARSHALL   TECHNIQUE: Axial CT images of the abdomen and pelvis with coronal and sagittal reconstructed images  performed after intravenous administration of 75 cc Omnipaque 350.   FINDINGS: LOWER CHEST: No acute abnormality of the lung bases. Calcified granulomata in the left lower lung. Normal heart size. Coronary artery stent. BONES: No acute osseous abnormality.DISH of thoracic spine. Moderate to severe discogenic and facet degeneration of the lumbar spine. No destructive osseous lesion. Moderate to severe bilateral hip osteoarthropathy. ABDOMINAL WALL: Paramedian right upper quadrant ventral hernia containing omental fat.   ABDOMEN:   LIVER: Within normal limits. BILE DUCTS: Normal caliber. GALLBLADDER: Small volume of dependent intraluminal high density suggesting noncalcified cholelithiasis. No wall thickening or pericholecystic fluid. PANCREAS: Within normal limits. SPLEEN: Within normal limits. ADRENALS: Within normal limits. KIDNEYS and URETERS: Scattered cortical cysts incidentally noted. Otherwise, normal.     VESSELS: Mild partially calcified atherosclerosis of the abdominal aorta and branch vessels. Incidentally noted circumaortic left renal vein. RETROPERITONEUM: No pathologically enlarged retroperitoneal lymph nodes.   PELVIS:   REPRODUCTIVE ORGANS: Prostate is enlarged. BLADDER: Incompletely distended and suboptimally evaluated. No definite abnormality.   BOWEL: Stomach appears within normal limits. Ingested pills are present in the stomach. No dilated or thickened small bowel. There is fluid and haustral fold thickening and mucosal hyperemia throughout the colon compatible with colitis. Colon is normal in caliber. Colonic diverticulosis without evidence of acute diverticulitis. Normal appendix. PERITONEUM: No ascites or free air, no fluid collection.       There is fluid and haustral fold thickening and mucosal hyperemia throughout the colon compatible with uncomplicated acute colitis.   Incidental renal cortical cysts.   Prostatomegaly.   Atherosclerosis, including coronary artery disease, noting presence  of a coronary stent.   Additional findings as discussed above.     MACRO: None   Signed by: Milad Jacques 2/9/2024 9:42 PM Dictation workstation:   [id number]    XR chest 1 view    Result Date: 2/9/2024  Interpreted By:  Johan Ayala, STUDY: XR CHEST 1 VIEW  2/9/2024 7:59 pm   INDICATION: Signs/Symptoms:preop   COMPARISON: 08/30/2019   ACCESSION NUMBER(S): [id number]   ORDERING CLINICIAN: CARISA MARSHALL   TECHNIQUE: A single AP portable radiograph of the chest was obtained.   FINDINGS: No focal infiltrate, pleural effusion or pneumothorax is identified. The cardiac silhouette is within normal limits for size.       No focal infiltrate or pneumothorax is identified.   MACRO: None.   Signed by: Johan Ayala 2/9/2024 8:03 PM Dictation workstation:   [id number]       Assessment/Plan   Flare of ulcerative colitis with bloody diarrhea that may have been triggered by eating greasy pork  Plan:  Continue mesalamine  IV hydration  Serial H&H  IV Protonix  Pain and nausea control  CT of the abdomen and pelvis showed evidence of colitis and prostamegaly but no other acute or surgical findings  GI consultation    DVT prophylaxis  No heparin or Lovenox due to active GI bleeding  SCDs    I spent 60 minutes in the professional and overall care of this patient.      Amarjit Loving DO

## 2024-02-10 NOTE — CARE PLAN
Problem: Pain  Goal: My pain/discomfort is manageable  Outcome: Progressing     Problem: Safety  Goal: Patient will be injury free during hospitalization  Outcome: Progressing  Goal: I will remain free of falls  Outcome: Progressing     Problem: Daily Care  Goal: Daily care needs are met  Outcome: Progressing     Problem: Psychosocial Needs  Goal: Demonstrates ability to cope with hospitalization/illness  Outcome: Progressing  Goal: Collaborate with me, my family, and caregiver to identify my specific goals  Outcome: Progressing     Problem: Discharge Barriers  Goal: My discharge needs are met  Outcome: Progressing     Problem: Fall/Injury  Goal: Not fall by end of shift  Outcome: Progressing  Goal: Be free from injury by end of the shift  Outcome: Progressing  Goal: Verbalize understanding of personal risk factors for fall in the hospital  Outcome: Progressing  Goal: Verbalize understanding of risk factor reduction measures to prevent injury from fall in the home  Outcome: Progressing  Goal: Use assistive devices by end of the shift  Outcome: Progressing  Goal: Pace activities to prevent fatigue by end of the shift  Outcome: Progressing

## 2024-02-10 NOTE — ED PROVIDER NOTES
"HPI   Chief Complaint   Patient presents with    Rectal Bleeding       HPI     Patient is a 68-year-old male with a past medical history of ulcerative colitis on mesalamine presenting to the emergency department with rectal bleeding.  Patient states that he had some greasy pork around 3 days ago which he believes kicked off a cycle of diarrhea.  He states that initially he had bright red blood \"like when you slit a cow's throat\" followed by frequent diarrhea.  Is now having mixed brownish and reddish diarrhea.  States that he is that he has had decreased p.o. intake because every time that he eats or drinks anything he has a large bout of diarrhea.  Feels that he is starting to get dehydrated.  States that his most recent colonoscopy in August was normal and showed remission of his ulcerative colitis.  This is his first flareup since then.  He has been taking his mesalamine as prescribed.  Now has cramping diffuse abdominal pain, but denies any pain with urination, blood in urine, chest pain, shortness of breath, fever, chills, cough, congestion.               Key Coma Scale Score: 15                     Patient History   Past Medical History:   Diagnosis Date    Ulcerative colitis (CMS/HCC)      Past Surgical History:   Procedure Laterality Date    KNEE SURGERY       No family history on file.  Social History     Tobacco Use    Smoking status: Never    Smokeless tobacco: Never   Substance Use Topics    Alcohol use: Not on file    Drug use: Not on file       Physical Exam   ED Triage Vitals [02/1954]   Temperature Heart Rate Respirations BP   36.2 °C (97.2 °F) 91 16 137/89      Pulse Ox Temp Source Heart Rate Source Patient Position   97 % Temporal Monitor Sitting      BP Location FiO2 (%)     Right arm --       Physical Exam  Constitutional:       Appearance: He is obese. He is not ill-appearing, toxic-appearing or diaphoretic.   HENT:      Head: Normocephalic and atraumatic.      Nose: Nose normal. "   Eyes:      General: No scleral icterus.        Right eye: No discharge.         Left eye: No discharge.      Conjunctiva/sclera: Conjunctivae normal.   Cardiovascular:      Rate and Rhythm: Normal rate.      Heart sounds: No murmur heard.     No friction rub. No gallop.   Pulmonary:      Effort: No respiratory distress.      Breath sounds: Normal breath sounds.   Abdominal:      General: There is no distension.      Palpations: Abdomen is soft.      Tenderness: There is abdominal tenderness (Mild tenderness in all 4 quadrants without rebound or guarding). There is no right CVA tenderness, left CVA tenderness, guarding or rebound.      Hernia: A hernia (Midline abdominal ventral wall, easily reducible) is present.   Genitourinary:     Penis: Normal.       Testes: Normal.   Musculoskeletal:         General: No swelling, tenderness, deformity or signs of injury.      Cervical back: Neck supple. No rigidity.   Skin:     General: Skin is warm and dry.      Coloration: Skin is not jaundiced or pale.      Findings: No bruising, erythema, lesion or rash.   Neurological:      General: No focal deficit present.      Mental Status: He is alert and oriented to person, place, and time.      Sensory: No sensory deficit.      Motor: No weakness.   Psychiatric:         Mood and Affect: Mood normal.         Behavior: Behavior normal.       ED Course & MDM   Diagnoses as of 02/10/24 1206   Gastrointestinal hemorrhage, unspecified gastrointestinal hemorrhage type   Ulcerative colitis with complication, unspecified location (CMS/formerly [facility])   EKG taken at 2005 showing normal sinus rate and rhythm, normal axis, normal intervals, no signs of acute ST elevation or depression    Medical Decision Making  Patient is a 68-year-old male with a past medical history of ulcerative colitis presenting to the emergency department with diarrhea and bloody bowel movements.  Patient believes unable to manage at home given that he was spending most of his  time on the toilet and was unable to tolerate small amounts of p.o. because he was in immediate bowel distress immediately afterwards.  Sent for CT imaging to evaluate for complications related to ulcerative colitis including the possibility of perforated diverticula, fistula formation, intra-abdominal abscess.  This showed evidence of colitis and prostatomegaly, but no other acute or surgical findings.  Patient will likely require GI scope to evaluate his ulcerative colitis.  And given his lack of symptomatic control, will require inpatient admission.  I did send a message to the transfer center to admit the patient to an accepting facility that has GI coverage.  Patient was amenable to this choice and consented to transfer.  Patient will be handed off in stable condition pending results of conversation with the transfer center.  Patient otherwise showing no evidence of severe anemia, hemodynamically stable, not showing evidence of large-volume GI bleed.  Cannot definitively rule out smaller source of bleeding within the patient's GI tract, this would be more adequately assessed by a scope  As an inpatient.  No other evidence of flu, COVID, lipase elevation to suggest pancreatitis, or LFT elevations to suggest cholecystitis.    Procedure  Procedures     Washington Richardson MD  Resident  02/09/24 3975       Washington Richardson MD  Resident  02/10/24 1201

## 2024-02-11 LAB
ANION GAP SERPL CALC-SCNC: 14 MMOL/L (ref 10–20)
BUN SERPL-MCNC: 15 MG/DL (ref 6–23)
C COLI+JEJ+UPSA DNA STL QL NAA+PROBE: NOT DETECTED
C DIF TOX TCDA+TCDB STL QL NAA+PROBE: NOT DETECTED
CALCIUM SERPL-MCNC: 8.5 MG/DL (ref 8.6–10.3)
CHLORIDE SERPL-SCNC: 107 MMOL/L (ref 98–107)
CO2 SERPL-SCNC: 22 MMOL/L (ref 21–32)
CREAT SERPL-MCNC: 0.74 MG/DL (ref 0.5–1.3)
EC STX1 GENE STL QL NAA+PROBE: NOT DETECTED
EC STX2 GENE STL QL NAA+PROBE: NOT DETECTED
EGFRCR SERPLBLD CKD-EPI 2021: >90 ML/MIN/1.73M*2
ERYTHROCYTE [DISTWIDTH] IN BLOOD BY AUTOMATED COUNT: 12.8 % (ref 11.5–14.5)
GLUCOSE SERPL-MCNC: 103 MG/DL (ref 74–99)
HCT VFR BLD AUTO: 28.3 % (ref 41–52)
HGB BLD-MCNC: 9.4 G/DL (ref 13.5–17.5)
MCH RBC QN AUTO: 30.6 PG (ref 26–34)
MCHC RBC AUTO-ENTMCNC: 33.2 G/DL (ref 32–36)
MCV RBC AUTO: 92 FL (ref 80–100)
NOROVIRUS GI + GII RNA STL NAA+PROBE: DETECTED
NRBC BLD-RTO: 0 /100 WBCS (ref 0–0)
PLATELET # BLD AUTO: 221 X10*3/UL (ref 150–450)
POTASSIUM SERPL-SCNC: 3.8 MMOL/L (ref 3.5–5.3)
RBC # BLD AUTO: 3.07 X10*6/UL (ref 4.5–5.9)
RV RNA STL NAA+PROBE: NOT DETECTED
SALMONELLA DNA STL QL NAA+PROBE: NOT DETECTED
SHIGELLA DNA SPEC QL NAA+PROBE: NOT DETECTED
SODIUM SERPL-SCNC: 139 MMOL/L (ref 136–145)
V CHOLERAE DNA STL QL NAA+PROBE: NOT DETECTED
WBC # BLD AUTO: 10.8 X10*3/UL (ref 4.4–11.3)
Y ENTEROCOL DNA STL QL NAA+PROBE: NOT DETECTED

## 2024-02-11 PROCEDURE — 99232 SBSQ HOSP IP/OBS MODERATE 35: CPT | Performed by: INTERNAL MEDICINE

## 2024-02-11 PROCEDURE — 2500000004 HC RX 250 GENERAL PHARMACY W/ HCPCS (ALT 636 FOR OP/ED): Performed by: INTERNAL MEDICINE

## 2024-02-11 PROCEDURE — 85027 COMPLETE CBC AUTOMATED: CPT | Performed by: INTERNAL MEDICINE

## 2024-02-11 PROCEDURE — 36415 COLL VENOUS BLD VENIPUNCTURE: CPT | Performed by: INTERNAL MEDICINE

## 2024-02-11 PROCEDURE — 2500000002 HC RX 250 W HCPCS SELF ADMINISTERED DRUGS (ALT 637 FOR MEDICARE OP, ALT 636 FOR OP/ED): Performed by: INTERNAL MEDICINE

## 2024-02-11 PROCEDURE — 2500000001 HC RX 250 WO HCPCS SELF ADMINISTERED DRUGS (ALT 637 FOR MEDICARE OP): Performed by: INTERNAL MEDICINE

## 2024-02-11 PROCEDURE — 1200000002 HC GENERAL ROOM WITH TELEMETRY DAILY

## 2024-02-11 PROCEDURE — 80048 BASIC METABOLIC PNL TOTAL CA: CPT | Performed by: INTERNAL MEDICINE

## 2024-02-11 PROCEDURE — C9113 INJ PANTOPRAZOLE SODIUM, VIA: HCPCS | Performed by: INTERNAL MEDICINE

## 2024-02-11 RX ADMIN — METHYLPREDNISOLONE SODIUM SUCCINATE 40 MG: 40 INJECTION, POWDER, FOR SOLUTION INTRAMUSCULAR; INTRAVENOUS at 08:56

## 2024-02-11 RX ADMIN — PANTOPRAZOLE SODIUM 40 MG: 40 INJECTION, POWDER, FOR SOLUTION INTRAVENOUS at 06:15

## 2024-02-11 RX ADMIN — TAMSULOSIN HYDROCHLORIDE 0.4 MG: 0.4 CAPSULE ORAL at 08:56

## 2024-02-11 RX ADMIN — MESALAMINE 3.6 G: 1.2 TABLET, DELAYED RELEASE ORAL at 15:55

## 2024-02-11 RX ADMIN — SODIUM CHLORIDE 100 ML/HR: 9 INJECTION, SOLUTION INTRAVENOUS at 04:34

## 2024-02-11 ASSESSMENT — COGNITIVE AND FUNCTIONAL STATUS - GENERAL
MOBILITY SCORE: 24
DAILY ACTIVITIY SCORE: 24

## 2024-02-11 ASSESSMENT — PAIN - FUNCTIONAL ASSESSMENT
PAIN_FUNCTIONAL_ASSESSMENT: 0-10
PAIN_FUNCTIONAL_ASSESSMENT: 0-10

## 2024-02-11 ASSESSMENT — PAIN SCALES - GENERAL
PAINLEVEL_OUTOF10: 0 - NO PAIN

## 2024-02-11 NOTE — PROGRESS NOTES
"Shakir Arciniega is a 68 y.o. male on day 1 of admission presenting with Lower GI bleed.    Subjective   Patient feels much better.  No bowel movement for last 18 hours.  No rectal bleeding overnight or melena.       Objective     Physical Exam    CONSTITUTIONAL: no acute distress, appears stated age  PULMONARY: clear to auscultation bilaterally  CARDIOVASCULAR: regular rate and rhythm  ABDOMEN: soft, non-tender  NEUROLOGIC: alert and oriented to person/place/time      Last Recorded Vitals  Blood pressure 117/71, pulse 60, temperature 36.6 °C (97.9 °F), temperature source Temporal, resp. rate 16, height 1.676 m (5' 5.98\"), weight 87.1 kg (192 lb 0.3 oz), SpO2 97 %.  Intake/Output last 3 Shifts:  I/O last 3 completed shifts:  In: 1665 (19.1 mL/kg) [I.V.:1665 (19.1 mL/kg)]  Out: - (0 mL/kg)   Weight: 87.1 kg     Relevant Results    Scheduled medications  melatonin, 3 mg, oral, Daily  mesalamine, 3.6 g, oral, Daily with evening meal  methylPREDNISolone sodium succinate (PF), 40 mg, intravenous, q24h  pantoprazole, 40 mg, oral, Daily before breakfast   Or  pantoprazole, 40 mg, intravenous, Daily before breakfast  tamsulosin, 0.4 mg, oral, Daily      Continuous medications  sodium chloride 0.9%, 100 mL/hr, Last Rate: 100 mL/hr (02/11/24 0434)      PRN medications  PRN medications: acetaminophen **OR** acetaminophen **OR** acetaminophen, diclofenac sodium, ondansetron **OR** ondansetron, ondansetron **OR** ondansetron  Results for orders placed or performed during the hospital encounter of 02/10/24 (from the past 24 hour(s))   CBC   Result Value Ref Range    WBC 10.8 4.4 - 11.3 x10*3/uL    nRBC 0.0 0.0 - 0.0 /100 WBCs    RBC 3.07 (L) 4.50 - 5.90 x10*6/uL    Hemoglobin 9.4 (L) 13.5 - 17.5 g/dL    Hematocrit 28.3 (L) 41.0 - 52.0 %    MCV 92 80 - 100 fL    MCH 30.6 26.0 - 34.0 pg    MCHC 33.2 32.0 - 36.0 g/dL    RDW 12.8 11.5 - 14.5 %    Platelets 221 150 - 450 x10*3/uL   Basic metabolic panel   Result Value Ref Range    Glucose " 103 (H) 74 - 99 mg/dL    Sodium 139 136 - 145 mmol/L    Potassium 3.8 3.5 - 5.3 mmol/L    Chloride 107 98 - 107 mmol/L    Bicarbonate 22 21 - 32 mmol/L    Anion Gap 14 10 - 20 mmol/L    Urea Nitrogen 15 6 - 23 mg/dL    Creatinine 0.74 0.50 - 1.30 mg/dL    eGFR >90 >60 mL/min/1.73m*2    Calcium 8.5 (L) 8.6 - 10.3 mg/dL           CT abdomen pelvis w IV contrast    Result Date: 2/9/2024  Interpreted By:  Milad Jacques, STUDY: CT ABDOMEN PELVIS W IV CONTRAST; ;  2/9/2024 9:02 pm   INDICATION: Signs/Symptoms:ulcerative colitis with abdominal pain and rectal bleeding.   COMPARISON: None.   ACCESSION NUMBER(S): [id number]   ORDERING CLINICIAN: CARISA MARSHALL   TECHNIQUE: Axial CT images of the abdomen and pelvis with coronal and sagittal reconstructed images performed after intravenous administration of 75 cc Omnipaque 350.   FINDINGS: LOWER CHEST: No acute abnormality of the lung bases. Calcified granulomata in the left lower lung. Normal heart size. Coronary artery stent. BONES: No acute osseous abnormality.DISH of thoracic spine. Moderate to severe discogenic and facet degeneration of the lumbar spine. No destructive osseous lesion. Moderate to severe bilateral hip osteoarthropathy. ABDOMINAL WALL: Paramedian right upper quadrant ventral hernia containing omental fat.   ABDOMEN:   LIVER: Within normal limits. BILE DUCTS: Normal caliber. GALLBLADDER: Small volume of dependent intraluminal high density suggesting noncalcified cholelithiasis. No wall thickening or pericholecystic fluid. PANCREAS: Within normal limits. SPLEEN: Within normal limits. ADRENALS: Within normal limits. KIDNEYS and URETERS: Scattered cortical cysts incidentally noted. Otherwise, normal.     VESSELS: Mild partially calcified atherosclerosis of the abdominal aorta and branch vessels. Incidentally noted circumaortic left renal vein. RETROPERITONEUM: No pathologically enlarged retroperitoneal lymph nodes.   PELVIS:   REPRODUCTIVE ORGANS: Prostate is  enlarged. BLADDER: Incompletely distended and suboptimally evaluated. No definite abnormality.   BOWEL: Stomach appears within normal limits. Ingested pills are present in the stomach. No dilated or thickened small bowel. There is fluid and haustral fold thickening and mucosal hyperemia throughout the colon compatible with colitis. Colon is normal in caliber. Colonic diverticulosis without evidence of acute diverticulitis. Normal appendix. PERITONEUM: No ascites or free air, no fluid collection.       There is fluid and haustral fold thickening and mucosal hyperemia throughout the colon compatible with uncomplicated acute colitis.   Incidental renal cortical cysts.   Prostatomegaly.   Atherosclerosis, including coronary artery disease, noting presence of a coronary stent.   Additional findings as discussed above.     MACRO: None   Signed by: Milad Jacques 2/9/2024 9:42 PM Dictation workstation:   [id number]    XR chest 1 view    Result Date: 2/9/2024  Interpreted By:  Johan Ayala, STUDY: XR CHEST 1 VIEW  2/9/2024 7:59 pm   INDICATION: Signs/Symptoms:preop   COMPARISON: 08/30/2019   ACCESSION NUMBER(S): [id number]   ORDERING CLINICIAN: CARISA MARSHALL   TECHNIQUE: A single AP portable radiograph of the chest was obtained.   FINDINGS: No focal infiltrate, pleural effusion or pneumothorax is identified. The cardiac silhouette is within normal limits for size.       No focal infiltrate or pneumothorax is identified.   MACRO: None.   Signed by: Johan Ayala 2/9/2024 8:03 PM Dictation workstation:   [id number]    * Cannot find OR log *  Last relevant procedure:                          Assessment/Plan   Principal Problem:    Lower GI bleed    Patient admitted with acute diarrhea and rectal bleeding with history of ulcerative colitis managed on mesalamine therapy in the past.  [place]hler to be UC flare.  Stool studies collected for C. difficile and stool pathogen PCR and pending.  On steroid therapy at this point.  Would  continue current management and can advance diet to regular diet.  If stool studies negative then can be discharged on prednisone 40 mg daily in addition to his mesalamine with tapering of the prednisone down by 10 mg every 7 days.  Patient will be followed on GI consult service by Dr. Sawant starting tomorrowv2/12/24.           Ez Aquino MD

## 2024-02-11 NOTE — CARE PLAN
The patient's goals for the shift include maintain safety, monitor labs.     The clinical goals for the shift include NO INJURY DURING SHIFT    Over the shift, the patient did not make progress toward the following goals. Barriers to progression include n.a. Recommendations to address these barriers include hourly rounding, monitor labs..

## 2024-02-11 NOTE — PROGRESS NOTES
Shakir Arciniega is a 68 y.o. male on day 1 of admission presenting with Lower GI bleed.      Subjective   Patient was seen and examined bedside this morning, stated that he is still having ongoing blood per rectum, but not as significant as prior to admission, when he was going every 30 minutes.  Patient denies fever, chills, nausea, vomiting, chest pain or shortness of breath.       Objective     Last Recorded Vitals  /74   Pulse 68   Temp 36.5 °C (97.7 °F) (Temporal)   Resp 16   Wt 87.1 kg (192 lb 0.3 oz)   SpO2 93%   Intake/Output last 3 Shifts:    Intake/Output Summary (Last 24 hours) at 2/11/2024 1540  Last data filed at 2/11/2024 1120  Gross per 24 hour   Intake 1633.34 ml   Output --   Net 1633.34 ml       Admission Weight  Weight: 87.1 kg (192 lb 0.3 oz) (02/10/24 0431)    Daily Weight  02/10/24 : 87.1 kg (192 lb 0.3 oz)    Image Results  CT abdomen pelvis w IV contrast  Narrative: Interpreted By:  Milad Jacques,   STUDY:  CT ABDOMEN PELVIS W IV CONTRAST; ;  2/9/2024 9:02 pm      INDICATION:  Signs/Symptoms:ulcerative colitis with abdominal pain and rectal  bleeding.      COMPARISON:  None.      ACCESSION NUMBER(S):  [id number]      ORDERING CLINICIAN:  CARISA MARSHALL      TECHNIQUE:  Axial CT images of the abdomen and pelvis with coronal and sagittal  reconstructed images performed after intravenous administration of 75  cc Omnipaque 350.      FINDINGS:  LOWER CHEST: No acute abnormality of the lung bases. Calcified  granulomata in the left lower lung. Normal heart size. Coronary  artery stent. BONES: No acute osseous abnormality.DISH of thoracic  spine. Moderate to severe discogenic and facet degeneration of the  lumbar spine. No destructive osseous lesion. Moderate to severe  bilateral hip osteoarthropathy. ABDOMINAL WALL: Paramedian right  upper quadrant ventral hernia containing omental fat.      ABDOMEN:      LIVER: Within normal limits.  BILE DUCTS: Normal caliber.  GALLBLADDER: Small volume of  dependent intraluminal high density  suggesting noncalcified cholelithiasis. No wall thickening or  pericholecystic fluid. PANCREAS: Within normal limits.  SPLEEN: Within normal limits.  ADRENALS: Within normal limits.  KIDNEYS and URETERS: Scattered cortical cysts incidentally noted.  Otherwise, normal.          VESSELS: Mild partially calcified atherosclerosis of the abdominal  aorta and branch vessels. Incidentally noted circumaortic left renal  vein. RETROPERITONEUM: No pathologically enlarged retroperitoneal  lymph nodes.      PELVIS:      REPRODUCTIVE ORGANS: Prostate is enlarged.  BLADDER: Incompletely distended and suboptimally evaluated. No  definite abnormality.      BOWEL: Stomach appears within normal limits. Ingested pills are  present in the stomach. No dilated or thickened small bowel. There is  fluid and haustral fold thickening and mucosal hyperemia throughout  the colon compatible with colitis. Colon is normal in caliber.  Colonic diverticulosis without evidence of acute diverticulitis.  Normal appendix. PERITONEUM: No ascites or free air, no fluid  collection.      Impression: There is fluid and haustral fold thickening and mucosal hyperemia  throughout the colon compatible with uncomplicated acute colitis.      Incidental renal cortical cysts.      Prostatomegaly.      Atherosclerosis, including coronary artery disease, noting presence  of a coronary stent.      Additional findings as discussed above.          MACRO:  None      Signed by: Milad Jacques 2/9/2024 9:42 PM  Dictation workstation:   [id number]  XR chest 1 view  Narrative: Interpreted By:  Johan Ayala,   STUDY:  XR CHEST 1 VIEW  2/9/2024 7:59 pm      INDICATION:  Signs/Symptoms:preop      COMPARISON:  08/30/2019      ACCESSION NUMBER(S):  [id number]      ORDERING CLINICIAN:  CARISA MARSHALL      TECHNIQUE:  A single AP portable radiograph of the chest was obtained.      FINDINGS:  No focal infiltrate, pleural effusion or pneumothorax is  identified.  The cardiac silhouette is within normal limits for size.      Impression: No focal infiltrate or pneumothorax is identified.      MACRO:  None.      Signed by: Johan Ayala 2/9/2024 8:03 PM  Dictation workstation:   [id number]      Physical Exam  Constitutional: Alert active, cooperative not in acute distress  Eyes: PERRLA, clear sclera  ENMT: Moist mucosal membranes, no exudate  Head / Neck: Atraumatic, normocephalic, supple neck, JVP not visualized  Lungs: Patent airways, CTABL  Heart: RRR, S1S2, no murmurs appreciated, palpable pulses in all extremities  GI: Soft, NT, ND, bowel sounds present in all quadrants  MSK: Moves all extremities freely, no restriction  of ROM, no joint edema  Extremities: Intact x 4, no peripheral edema  : No Cordova catheter inserted  Breast: Deferred  Neurological: AAO x 3 to person, place and date, facial muscles symmetrical, sensation intact, strength 4/4, no acute focal neurological deficits appreciated  Psychological: Appropriate mood and behavior    Relevant Results             Scheduled medications  melatonin, 3 mg, oral, Daily  mesalamine, 3.6 g, oral, Daily with evening meal  methylPREDNISolone sodium succinate (PF), 40 mg, intravenous, q24h  pantoprazole, 40 mg, oral, Daily before breakfast   Or  pantoprazole, 40 mg, intravenous, Daily before breakfast  tamsulosin, 0.4 mg, oral, Daily      Continuous medications  sodium chloride 0.9%, 100 mL/hr, Last Rate: 100 mL/hr (02/11/24 1120)      PRN medications  PRN medications: acetaminophen **OR** acetaminophen **OR** acetaminophen, diclofenac sodium, ondansetron **OR** ondansetron, ondansetron **OR** ondansetron    Assessment/Plan      68 y.o. male with a past medical history of ulcerative colitis on mesalamine who presented to the emergency room at [facility] complaining of rectal bleeding and diarrhea for the past 2 days     Principal Problem:    Lower GI bleed    Lower GI bleed secondary to ulcerative  colitis  -Monitor H&H, currently stable, no transfusion needed  -Continue mesalamine  -Prednisolone 40 mg IV daily added  -GI consulted: Appreciate management and recommendations  -IV fluid 0.9% NS at 100 mL/h     Chronic OA of the knee joint  -Tylenol 650 mg every 6 hours as needed pain    Diet  -Clear liquid diet    DVT prophylaxis  -Omission of heparin anticoagulation due to acute GI bleed    Disposition: Presented with lower GI bleed secondary to UC flareup, need further management, discharge pending clinical improvement and final recommendation by HE Rogel DO       (0) swallows foods/liquids without difficulty

## 2024-02-11 NOTE — TREATMENT PLAN
"Progress note    Patient was seen and examined at bedside this morning, stated that he arrived uneventfully, as a transfer from [facility].  Stated that she developed flareup of ulcerative colitis after eating fatty pork meat.  Reported noticing blood in the stool denies fever, chills.    /82   Pulse 95   Temp 36.1 °C (97 °F)   Resp 17   Ht 1.676 m (5' 5.98\")   Wt 87.1 kg (192 lb 0.3 oz)   SpO2 94%   BMI 31.01 kg/m²       Constitutional: Alert active, cooperative not in acute distress  Eyes: PERRLA, clear sclera  ENMT: Moist mucosal membranes, no exudate  Head / Neck: Atraumatic, normocephalic, supple neck, JVP not visualized  Lungs: Patent airways, CTABL  Heart: RRR, S1S2, no murmurs appreciated, palpable pulses in all extremities  GI: Soft, NT, ND, bowel sounds present in all quadrants  MSK: Moves all extremities freely, no restriction  of ROM, no joint edema  Extremities: Intact x 4, no peripheral edema  : No Cordova catheter inserted  Breast: Deferred  Neurological: AAO x 3 to person, place and date, facial muscles symmetrical, sensation intact, strength 4/4, no acute focal neurological deficits appreciated  Psychological: Appropriate mood and behavior    Scheduled medications  melatonin, 3 mg, oral, Daily  mesalamine, 3.6 g, oral, Daily with evening meal  methylPREDNISolone sodium succinate (PF), 40 mg, intravenous, q24h  pantoprazole, 40 mg, oral, Daily before breakfast   Or  pantoprazole, 40 mg, intravenous, Daily before breakfast  tamsulosin, 0.4 mg, oral, Daily      Continuous medications  sodium chloride 0.9%, 100 mL/hr, Last Rate: 100 mL/hr (02/10/24 6362)      PRN medications  PRN medications: acetaminophen **OR** acetaminophen **OR** acetaminophen, diclofenac sodium, ondansetron **OR** ondansetron, ondansetron **OR** ondansetron      Assessment and plan    Lower GI bleed secondary to ulcerative colitis  -Monitor H&H, currently stable, no transfusion needed  -Continue " mesalamine  -Prednisolone 40 mg IV daily added  -GI consulted: Appreciate management and recommendations

## 2024-02-12 VITALS
HEART RATE: 91 BPM | OXYGEN SATURATION: 93 % | DIASTOLIC BLOOD PRESSURE: 73 MMHG | WEIGHT: 192.02 LBS | RESPIRATION RATE: 18 BRPM | TEMPERATURE: 97.6 F | BODY MASS INDEX: 30.86 KG/M2 | SYSTOLIC BLOOD PRESSURE: 136 MMHG | HEIGHT: 66 IN

## 2024-02-12 LAB
ANION GAP SERPL CALC-SCNC: 10 MMOL/L (ref 10–20)
ATRIAL RATE: 93 BPM
BUN SERPL-MCNC: 12 MG/DL (ref 6–23)
CALCIUM SERPL-MCNC: 9 MG/DL (ref 8.6–10.3)
CHLORIDE SERPL-SCNC: 106 MMOL/L (ref 98–107)
CO2 SERPL-SCNC: 27 MMOL/L (ref 21–32)
CREAT SERPL-MCNC: 0.77 MG/DL (ref 0.5–1.3)
EGFRCR SERPLBLD CKD-EPI 2021: >90 ML/MIN/1.73M*2
ERYTHROCYTE [DISTWIDTH] IN BLOOD BY AUTOMATED COUNT: 13 % (ref 11.5–14.5)
GLUCOSE SERPL-MCNC: 102 MG/DL (ref 74–99)
HCT VFR BLD AUTO: 31.8 % (ref 41–52)
HGB BLD-MCNC: 10.2 G/DL (ref 13.5–17.5)
MCH RBC QN AUTO: 29.8 PG (ref 26–34)
MCHC RBC AUTO-ENTMCNC: 32.1 G/DL (ref 32–36)
MCV RBC AUTO: 93 FL (ref 80–100)
NRBC BLD-RTO: 0 /100 WBCS (ref 0–0)
P AXIS: 41 DEGREES
P OFFSET: 203 MS
P ONSET: 159 MS
PLATELET # BLD AUTO: 266 X10*3/UL (ref 150–450)
POTASSIUM SERPL-SCNC: 3.6 MMOL/L (ref 3.5–5.3)
PR INTERVAL: 114 MS
Q ONSET: 216 MS
QRS COUNT: 15 BEATS
QRS DURATION: 80 MS
QT INTERVAL: 348 MS
QTC CALCULATION(BAZETT): 432 MS
QTC FREDERICIA: 403 MS
R AXIS: -5 DEGREES
RBC # BLD AUTO: 3.42 X10*6/UL (ref 4.5–5.9)
SODIUM SERPL-SCNC: 139 MMOL/L (ref 136–145)
T AXIS: 24 DEGREES
T OFFSET: 390 MS
VENTRICULAR RATE: 93 BPM
WBC # BLD AUTO: 12 X10*3/UL (ref 4.4–11.3)

## 2024-02-12 PROCEDURE — 2500000004 HC RX 250 GENERAL PHARMACY W/ HCPCS (ALT 636 FOR OP/ED): Performed by: INTERNAL MEDICINE

## 2024-02-12 PROCEDURE — 80048 BASIC METABOLIC PNL TOTAL CA: CPT | Performed by: INTERNAL MEDICINE

## 2024-02-12 PROCEDURE — 85027 COMPLETE CBC AUTOMATED: CPT | Performed by: INTERNAL MEDICINE

## 2024-02-12 PROCEDURE — 99239 HOSP IP/OBS DSCHRG MGMT >30: CPT | Performed by: INTERNAL MEDICINE

## 2024-02-12 PROCEDURE — 99232 SBSQ HOSP IP/OBS MODERATE 35: CPT | Performed by: INTERNAL MEDICINE

## 2024-02-12 PROCEDURE — 2500000002 HC RX 250 W HCPCS SELF ADMINISTERED DRUGS (ALT 637 FOR MEDICARE OP, ALT 636 FOR OP/ED): Performed by: INTERNAL MEDICINE

## 2024-02-12 PROCEDURE — 36415 COLL VENOUS BLD VENIPUNCTURE: CPT | Performed by: INTERNAL MEDICINE

## 2024-02-12 RX ORDER — PREDNISONE 20 MG/1
TABLET ORAL
Qty: 35 TABLET | Refills: 0 | Status: SHIPPED | OUTPATIENT
Start: 2024-02-12 | End: 2024-03-11

## 2024-02-12 RX ADMIN — TAMSULOSIN HYDROCHLORIDE 0.4 MG: 0.4 CAPSULE ORAL at 09:26

## 2024-02-12 RX ADMIN — ONDANSETRON 4 MG: 2 INJECTION INTRAMUSCULAR; INTRAVENOUS at 09:27

## 2024-02-12 RX ADMIN — PANTOPRAZOLE SODIUM 40 MG: 40 TABLET, DELAYED RELEASE ORAL at 06:18

## 2024-02-12 RX ADMIN — METHYLPREDNISOLONE SODIUM SUCCINATE 40 MG: 40 INJECTION, POWDER, FOR SOLUTION INTRAMUSCULAR; INTRAVENOUS at 09:26

## 2024-02-12 SDOH — SOCIAL STABILITY: SOCIAL INSECURITY: WITHIN THE LAST YEAR, HAVE YOU BEEN AFRAID OF YOUR PARTNER OR EX-PARTNER?: NO

## 2024-02-12 SDOH — HEALTH STABILITY: MENTAL HEALTH: HOW OFTEN DO YOU HAVE 6 OR MORE DRINKS ON ONE OCCASION?: NEVER

## 2024-02-12 SDOH — ECONOMIC STABILITY: FOOD INSECURITY: WITHIN THE PAST 12 MONTHS, YOU WORRIED THAT YOUR FOOD WOULD RUN OUT BEFORE YOU GOT MONEY TO BUY MORE.: NEVER TRUE

## 2024-02-12 SDOH — SOCIAL STABILITY: SOCIAL INSECURITY
WITHIN THE LAST YEAR, HAVE YOU BEEN KICKED, HIT, SLAPPED, OR OTHERWISE PHYSICALLY HURT BY YOUR PARTNER OR EX-PARTNER?: NO

## 2024-02-12 SDOH — ECONOMIC STABILITY: HOUSING INSECURITY
IN THE LAST 12 MONTHS, WAS THERE A TIME WHEN YOU DID NOT HAVE A STEADY PLACE TO SLEEP OR SLEPT IN A SHELTER (INCLUDING NOW)?: NO

## 2024-02-12 SDOH — SOCIAL STABILITY: SOCIAL INSECURITY: WITHIN THE LAST YEAR, HAVE YOU BEEN HUMILIATED OR EMOTIONALLY ABUSED IN OTHER WAYS BY YOUR PARTNER OR EX-PARTNER?: NO

## 2024-02-12 SDOH — SOCIAL STABILITY: SOCIAL INSECURITY
WITHIN THE LAST YEAR, HAVE TO BEEN RAPED OR FORCED TO HAVE ANY KIND OF SEXUAL ACTIVITY BY YOUR PARTNER OR EX-PARTNER?: NO

## 2024-02-12 SDOH — ECONOMIC STABILITY: FOOD INSECURITY: WITHIN THE PAST 12 MONTHS, THE FOOD YOU BOUGHT JUST DIDN'T LAST AND YOU DIDN'T HAVE MONEY TO GET MORE.: NEVER TRUE

## 2024-02-12 SDOH — ECONOMIC STABILITY: INCOME INSECURITY: HOW HARD IS IT FOR YOU TO PAY FOR THE VERY BASICS LIKE FOOD, HOUSING, MEDICAL CARE, AND HEATING?: NOT HARD AT ALL

## 2024-02-12 SDOH — SOCIAL STABILITY: SOCIAL NETWORK: ARE YOU MARRIED, WIDOWED, DIVORCED, SEPARATED, NEVER MARRIED, OR LIVING WITH A PARTNER?: MARRIED

## 2024-02-12 SDOH — ECONOMIC STABILITY: INCOME INSECURITY: IN THE PAST 12 MONTHS, HAS THE ELECTRIC, GAS, OIL, OR WATER COMPANY THREATENED TO SHUT OFF SERVICE IN YOUR HOME?: NO

## 2024-02-12 SDOH — ECONOMIC STABILITY: HOUSING INSECURITY: IN THE LAST 12 MONTHS, HOW MANY PLACES HAVE YOU LIVED?: 1

## 2024-02-12 SDOH — ECONOMIC STABILITY: TRANSPORTATION INSECURITY
IN THE PAST 12 MONTHS, HAS LACK OF TRANSPORTATION KEPT YOU FROM MEETINGS, WORK, OR FROM GETTING THINGS NEEDED FOR DAILY LIVING?: NO

## 2024-02-12 SDOH — HEALTH STABILITY: MENTAL HEALTH: HOW MANY STANDARD DRINKS CONTAINING ALCOHOL DO YOU HAVE ON A TYPICAL DAY?: PATIENT DOES NOT DRINK

## 2024-02-12 SDOH — ECONOMIC STABILITY: TRANSPORTATION INSECURITY
IN THE PAST 12 MONTHS, HAS THE LACK OF TRANSPORTATION KEPT YOU FROM MEDICAL APPOINTMENTS OR FROM GETTING MEDICATIONS?: NO

## 2024-02-12 SDOH — HEALTH STABILITY: MENTAL HEALTH: HOW OFTEN DO YOU HAVE A DRINK CONTAINING ALCOHOL?: NEVER

## 2024-02-12 SDOH — ECONOMIC STABILITY: INCOME INSECURITY: IN THE LAST 12 MONTHS, WAS THERE A TIME WHEN YOU WERE NOT ABLE TO PAY THE MORTGAGE OR RENT ON TIME?: NO

## 2024-02-12 ASSESSMENT — PAIN SCALES - GENERAL: PAINLEVEL_OUTOF10: 0 - NO PAIN

## 2024-02-12 ASSESSMENT — LIFESTYLE VARIABLES
AUDIT-C TOTAL SCORE: 0
SKIP TO QUESTIONS 9-10: 1

## 2024-02-12 ASSESSMENT — PAIN - FUNCTIONAL ASSESSMENT: PAIN_FUNCTIONAL_ASSESSMENT: 0-10

## 2024-02-12 NOTE — PROGRESS NOTES
02/12/24 0941   Discharge Planning   Living Arrangements Spouse/significant other   Support Systems Spouse/significant other   Assistance Needed none   Type of Residence Private residence   Number of Stairs to Enter Residence 2   Number of Stairs Within Residence 0   Do you have animals or pets at home? No   Home or Post Acute Services None   Patient expects to be discharged to: home   Does the patient need discharge transport arranged? No   Financial Resource Strain   How hard is it for you to pay for the very basics like food, housing, medical care, and heating? Not hard   Housing Stability   In the last 12 months, was there a time when you were not able to pay the mortgage or rent on time? N   In the last 12 months, how many places have you lived? 1   In the last 12 months, was there a time when you did not have a steady place to sleep or slept in a shelter (including now)? N   Transportation Needs   In the past 12 months, has lack of transportation kept you from medical appointments or from getting medications? no   In the past 12 months, has lack of transportation kept you from meetings, work, or from getting things needed for daily living? No     2/12/24 0942  Met with patient at bedside to discuss discharge planning.  Patient lives at home with his wife in a house.  He has been the primary caregiver for his wife for the last 5 1/2 years. His stepson is currently staying with is wife while he is in the hospital.  He is independent with ADLs and drives at baseline.  He does not use any assistive devices for ambulation.  He plans on returning home at discharge and does not anticipate any discharge needs.  He will notify the [facility] should any needs arise. ADOD today or tomorrow.  Stool studies came back positive for norovirus.  Trena Catherine RN [facility]

## 2024-02-12 NOTE — DISCHARGE SUMMARY
Discharge Diagnosis  Ulcerative colitis flare possibly secondary to acute viral gastroenteritis from norovirus    Issues Requiring Follow-Up  GI    Discharge Meds     Your medication list        START taking these medications        Instructions Last Dose Given Next Dose Due   predniSONE 20 mg tablet  Commonly known as: Deltasone  Start taking on: February 12, 2024      Take 2 tablets (40 mg) by mouth once daily for 7 days, THEN 1.5 tablets (30 mg) once daily for 7 days, THEN 1 tablet (20 mg) once daily for 7 days, THEN 0.5 tablets (10 mg) once daily for 7 days.              CONTINUE taking these medications        Instructions Last Dose Given Next Dose Due   acetaminophen 650 mg ER tablet  Commonly known as: Tylenol 8 Hour      Take 2 tablets (1,300 mg) by mouth 2 times a day as needed for mild pain (1 - 3). Do not crush, chew, or split.       diclofenac sodium 1 % gel  Commonly known as: Voltaren      Apply 1 Application topically 4 times a day. Bilat knee       mesalamine 1.2 gram EC tablet  Commonly known as: Lialda           pantoprazole 40 mg EC tablet  Commonly known as: ProtoNix           tamsulosin 0.4 mg 24 hr capsule  Commonly known as: Flomax                     Where to Get Your Medications        These medications were sent to [facility] [address] [place] [address]      Phone: [phone]   predniSONE 20 mg tablet         Test Results Pending At Discharge  Pending Labs       No current pending labs.            Hospital Course   Patient is a very pleasant 68-year-old gentleman presented with complaints of acute diarrhea and rectal bleeding.  Patient had a prolonged history of ulcerative colitis on mesalamine therapy.  Patient had a stool pathogen panel done which showed evidence of norovirus.  He was started on IV Solu-Medrol.  Felt to be more of a UC flare possibly secondary to exacerbation from the norovirus gastroenteritis.  He is being  discharged on a tapering dose of oral prednisone starting at 40 mg to be decreased by 10 mg a week.  Will follow-up with the his GI physician has been tolerating orals.    Time spent more than 30 minutes on discharge      Pertinent Physical Exam At Time of Discharge  Physical Exam  Constitutional:       Appearance: Normal appearance.   HENT:      Head: Normocephalic.      Mouth/Throat:      Mouth: Mucous membranes are dry.      Pharynx: Oropharynx is clear.   Cardiovascular:      Rate and Rhythm: Normal rate and regular rhythm.   Pulmonary:      Effort: Pulmonary effort is normal.      Breath sounds: Normal breath sounds.   Abdominal:      General: Abdomen is flat. Bowel sounds are normal.      Palpations: Abdomen is soft.   Skin:     Capillary Refill: Capillary refill takes less than 2 seconds.   Neurological:      General: No focal deficit present.      Mental Status: He is alert.         Outpatient Follow-Up  Future Appointments   Date Time Provider Department Center   2/13/2024 12:00 PM MD Elver Humphrey MD

## 2024-02-12 NOTE — CARE PLAN
The patient's goals for the shift include      The clinical goals for the shift include no injury during shift    Over the shift, the patient did not make progress toward the following goals. Barriers to progression include . Recommendations to address these barriers include .

## 2024-02-12 NOTE — PROGRESS NOTES
Pharmacy Medication History Review    Shakir Arciniega is a 68 y.o. male admitted for Lower GI bleed. Pharmacy reviewed the patient's [id number] medications and allergies for accuracy.    The list below reflectives the updated PTA list. Please review each medication in order reconciliation for additional clarification and justification.  Medications Prior to Admission   Medication Sig Dispense Refill Last Dose    acetaminophen (Tylenol 8 Hour) 650 mg ER tablet Take 2 tablets (1,300 mg) by mouth 2 times a day as needed for mild pain (1 - 3). Do not crush, chew, or split. 120 tablet 2     diclofenac sodium (Voltaren) 1 % gel gel Apply 1 Application topically 4 times a day. Bilat knee 200 g 2     mesalamine (Lialda) 1.2 gram EC tablet Take 3 tablets (3.6 g) by mouth once daily.   2/9/2024    pantoprazole (ProtoNix) 40 mg EC tablet Take 1 tablet (40 mg) by mouth once daily.       tamsulosin (Flomax) 0.4 mg 24 hr capsule Take 1 capsule (0.4 mg) by mouth once daily.   2/9/2024       Spoke to the patient/spouse. Patient takes 3 tabs of Mesalamine daily    The list below reflectives the updated allergy list. Please review each documented allergy for additional clarification and justification.  Allergies  Reviewed by Rachel Kaiser RN on 2/10/2024   No Known Allergies         Below are additional concerns with the patient's PTA list.      Melany Wang CPhT

## 2024-02-12 NOTE — NURSING NOTE

## 2024-02-12 NOTE — PROGRESS NOTES
GI Daily Progress Note    Assessment/Plan:    69 yo M admitted with acute diarrhea and rectal bleeding with history of ulcerative colitis managed on mesalamine therapy in the past. Uneeda to be UC flare vs acute viral gastroenteritis secondary to Norovirus. On steroid therapy at this point.   - regular diet.   If tolerates diet ok to discharge from GI standpoint with short prednisone taper with tapering of the prednisone down by 10 mg every 3-5 days.   - cont Mesalamin  GI will sign off, please call if any questions or further assistance needed     LOS: 2 days     Shakir Arciniega is a 68 y.o. male who was admitted with Lower GI bleed. He reports his symptoms are improving with treatment, having less diarrhea. Stool PCR was positive for Norovirus    Subjective:    Patient expresses feeling much better  Patient denies abd pain, n/v    Objective:    Vital signs in last 24 hours:  Temp:  [36.4 °C (97.5 °F)-37 °C (98.6 °F)] 36.4 °C (97.6 °F)  Heart Rate:  [73-91] 91  Resp:  [16-18] 18  BP: (126-150)/(71-84) 136/73    Intake/Output last 3 shifts:  I/O last 3 completed shifts:  In: 2533.3 (29.1 mL/kg) [I.V.:2533.3 (29.1 mL/kg)]  Out: - (0 mL/kg)   Weight: 87.1 kg   Intake/Output this shift:  No intake/output data recorded.    Physical Exam  Vitals reviewed.   Constitutional:       Appearance: Normal appearance. He is obese.   HENT:      Head: Normocephalic and atraumatic.      Nose: Nose normal.      Mouth/Throat:      Mouth: Mucous membranes are moist.      Pharynx: Oropharynx is clear.   Eyes:      Conjunctiva/sclera: Conjunctivae normal.   Cardiovascular:      Rate and Rhythm: Normal rate and regular rhythm.      Pulses: Normal pulses.      Heart sounds: Normal heart sounds.   Pulmonary:      Effort: Pulmonary effort is normal.      Breath sounds: Normal breath sounds.   Abdominal:      General: Bowel sounds are normal. There is no distension.      Palpations: Abdomen is soft. There is no mass.      Tenderness: There is no  abdominal tenderness. There is no guarding or rebound.      Hernia: No hernia is present.   Musculoskeletal:         General: Normal range of motion.      Cervical back: Neck supple.   Skin:     General: Skin is warm and dry.   Neurological:      General: No focal deficit present.      Mental Status: He is alert and oriented to person, place, and time.   Psychiatric:         Mood and Affect: Mood normal.         Behavior: Behavior normal.          Results for orders placed or performed during the hospital encounter of 02/10/24 (from the past 24 hour(s))   CBC   Result Value Ref Range    WBC 12.0 (H) 4.4 - 11.3 x10*3/uL    nRBC 0.0 0.0 - 0.0 /100 WBCs    RBC 3.42 (L) 4.50 - 5.90 x10*6/uL    Hemoglobin 10.2 (L) 13.5 - 17.5 g/dL    Hematocrit 31.8 (L) 41.0 - 52.0 %    MCV 93 80 - 100 fL    MCH 29.8 26.0 - 34.0 pg    MCHC 32.1 32.0 - 36.0 g/dL    RDW 13.0 11.5 - 14.5 %    Platelets 266 150 - 450 x10*3/uL   Basic Metabolic Panel   Result Value Ref Range    Glucose 102 (H) 74 - 99 mg/dL    Sodium 139 136 - 145 mmol/L    Potassium 3.6 3.5 - 5.3 mmol/L    Chloride 106 98 - 107 mmol/L    Bicarbonate 27 21 - 32 mmol/L    Anion Gap 10 10 - 20 mmol/L    Urea Nitrogen 12 6 - 23 mg/dL    Creatinine 0.77 0.50 - 1.30 mg/dL    eGFR >90 >60 mL/min/1.73m*2    Calcium 9.0 8.6 - 10.3 mg/dL

## 2024-02-13 ENCOUNTER — TELEPHONE (OUTPATIENT)
Dept: PAIN MEDICINE | Facility: CLINIC | Age: 69
End: 2024-02-13

## 2024-02-13 ENCOUNTER — OFFICE VISIT (OUTPATIENT)
Dept: PAIN MEDICINE | Facility: CLINIC | Age: 69
End: 2024-02-13
Payer: MEDICARE

## 2024-02-13 DIAGNOSIS — M17.11 PRIMARY OSTEOARTHRITIS OF RIGHT KNEE: Primary | ICD-10-CM

## 2024-02-13 PROCEDURE — 1159F MED LIST DOCD IN RCRD: CPT | Performed by: ANESTHESIOLOGY

## 2024-02-13 PROCEDURE — 1126F AMNT PAIN NOTED NONE PRSNT: CPT | Performed by: ANESTHESIOLOGY

## 2024-02-13 PROCEDURE — 2500000005 HC RX 250 GENERAL PHARMACY W/O HCPCS

## 2024-02-13 PROCEDURE — 20610 DRAIN/INJ JOINT/BURSA W/O US: CPT | Performed by: ANESTHESIOLOGY

## 2024-02-13 PROCEDURE — 1111F DSCHRG MED/CURRENT MED MERGE: CPT | Performed by: ANESTHESIOLOGY

## 2024-02-13 PROCEDURE — 1036F TOBACCO NON-USER: CPT | Performed by: ANESTHESIOLOGY

## 2024-02-13 PROCEDURE — 2500000004 HC RX 250 GENERAL PHARMACY W/ HCPCS (ALT 636 FOR OP/ED)

## 2024-02-13 NOTE — PROGRESS NOTES
Patient ID: Shakir Arciniega is a 68 y.o. male.    Joint Injection/Aspiration    Date/Time: 2/13/2024 11:31 AM    Performed by: Jose Rodriguez MD  Authorized by: Jose Rodriguez MD    Consent:     Consent obtained:  Written    Consent given by:  Patient    Risks, benefits, and alternatives were discussed: yes      Risks discussed:  Bleeding, infection and pain  Universal protocol:     Procedure explained and questions answered to patient or proxy's satisfaction: yes      Relevant documents present and verified: yes      Test results available: yes      Imaging studies available: yes      Required blood products, implants, devices, and special equipment available: yes      Site/side marked: yes      Immediately prior to procedure, a time out was called: yes      Patient identity confirmed:  Verbally with patient  Location:     Location:  Knee    Knee:  R knee  Anesthesia:     Anesthesia method:  Topical application    Topical anesthesia: ethyl chloride.  Procedure details:     Preparation: Patient was prepped and draped in usual sterile fashion      Needle gauge:  22 G    Ultrasound guidance: no      Approach:  Medial    Steroid injected: yes      Specimen collected: no    Post-procedure details:     Dressing:  Adhesive bandage    Procedure completion:  Tolerated  Comments:      History reviewed patient interviewed and examined.  Risks and benefits of procedure discussed, patient agreed to proceed and consent was signed.  With the patient in the sitting position the right knee was prepped and draped in a sterile fashion.  Using landmark based technique and a lateral to medial approach a 25-gauge inch and a half needle was inserted intra-articularly.  Aspiration was negative for heme or fluid.  Bupivacaine 0.25% mixed with triamcinolone 40 mg a total of 5 cc was injected.  Patient tolerated the procedure without a problem and was discharged home in stable condition.

## 2024-02-13 NOTE — TELEPHONE ENCOUNTER
When should I schedule patient for his next injection, he said it would be for his right shoulder.

## 2024-04-08 DIAGNOSIS — K51.311 ULCERATIVE RECTOSIGMOIDITIS WITH RECTAL BLEEDING (MULTI): Primary | ICD-10-CM

## 2024-04-09 RX ORDER — MESALAMINE 1.2 G/1
TABLET, DELAYED RELEASE ORAL
Qty: 270 TABLET | Refills: 0 | Status: SHIPPED | OUTPATIENT
Start: 2024-04-09

## 2024-04-25 ENCOUNTER — APPOINTMENT (OUTPATIENT)
Dept: GASTROENTEROLOGY | Facility: CLINIC | Age: 69
End: 2024-04-25
Payer: MEDICARE

## 2024-05-14 ENCOUNTER — OFFICE VISIT (OUTPATIENT)
Dept: PAIN MEDICINE | Facility: CLINIC | Age: 69
End: 2024-05-14
Payer: MEDICARE

## 2024-05-14 VITALS
HEIGHT: 65 IN | BODY MASS INDEX: 31.82 KG/M2 | OXYGEN SATURATION: 95 % | DIASTOLIC BLOOD PRESSURE: 84 MMHG | HEART RATE: 69 BPM | RESPIRATION RATE: 16 BRPM | SYSTOLIC BLOOD PRESSURE: 138 MMHG | WEIGHT: 191 LBS

## 2024-05-14 DIAGNOSIS — M19.019 SHOULDER ARTHRITIS: Primary | ICD-10-CM

## 2024-05-14 PROCEDURE — 20610 DRAIN/INJ JOINT/BURSA W/O US: CPT | Performed by: ANESTHESIOLOGY

## 2024-05-14 PROCEDURE — 1125F AMNT PAIN NOTED PAIN PRSNT: CPT | Performed by: ANESTHESIOLOGY

## 2024-05-14 PROCEDURE — 1159F MED LIST DOCD IN RCRD: CPT | Performed by: ANESTHESIOLOGY

## 2024-05-14 PROCEDURE — 2500000004 HC RX 250 GENERAL PHARMACY W/ HCPCS (ALT 636 FOR OP/ED): Performed by: ANESTHESIOLOGY

## 2024-05-14 RX ORDER — TRIAMCINOLONE ACETONIDE 40 MG/ML
40 INJECTION, SUSPENSION INTRA-ARTICULAR; INTRAMUSCULAR ONCE
Status: COMPLETED | OUTPATIENT
Start: 2024-05-14 | End: 2024-05-14

## 2024-05-14 RX ORDER — BUPIVACAINE HYDROCHLORIDE 2.5 MG/ML
1 INJECTION, SOLUTION INFILTRATION; PERINEURAL ONCE
Status: CANCELLED | OUTPATIENT
Start: 2024-05-14 | End: 2024-05-14

## 2024-05-14 RX ORDER — BUPIVACAINE HYDROCHLORIDE 2.5 MG/ML
1 INJECTION, SOLUTION INFILTRATION; PERINEURAL ONCE
Status: SHIPPED | OUTPATIENT
Start: 2024-05-14

## 2024-05-14 RX ORDER — TRIAMCINOLONE ACETONIDE 40 MG/ML
40 INJECTION, SUSPENSION INTRA-ARTICULAR; INTRAMUSCULAR ONCE
Status: CANCELLED | OUTPATIENT
Start: 2024-05-14 | End: 2024-05-14

## 2024-05-14 RX ADMIN — TRIAMCINOLONE ACETONIDE 40 MG: 40 INJECTION, SUSPENSION INTRA-ARTICULAR; INTRAMUSCULAR at 16:03

## 2024-05-14 ASSESSMENT — PAIN SCALES - GENERAL
PAINLEVEL: 1
PAINLEVEL_OUTOF10: 1

## 2024-05-14 ASSESSMENT — PAIN DESCRIPTION - DESCRIPTORS: DESCRIPTORS: ACHING

## 2024-05-14 ASSESSMENT — PAIN - FUNCTIONAL ASSESSMENT: PAIN_FUNCTIONAL_ASSESSMENT: 0-10

## 2024-05-14 NOTE — PROGRESS NOTES
Patient ID: Shakir Arciniega is a 68 y.o. male.    Joint Injection/Aspiration    Date/Time: 5/14/2024 1:34 PM    Performed by: Jose Rodriguez MD  Authorized by: Jose Rodriguez MD    Consent:     Consent obtained:  Verbal    Consent given by:  Patient    Risks, benefits, and alternatives were discussed: yes      Risks discussed:  Bleeding, infection and pain  Universal protocol:     Procedure explained and questions answered to patient or proxy's satisfaction: yes      Relevant documents present and verified: yes      Test results available: yes      Imaging studies available: yes      Required blood products, implants, devices, and special equipment available: yes      Site/side marked: yes      Immediately prior to procedure, a time out was called: yes      Patient identity confirmed:  Verbally with patient  Location:     Location:  Shoulder    Shoulder:  R glenohumeral  Anesthesia:     Anesthesia method:  None  Procedure details:     Preparation: Patient was prepped and draped in usual sterile fashion      Needle gauge: 25.    Approach:  Posterior    Steroid injected: yes      Specimen collected: no    Post-procedure details:     Dressing:  Adhesive bandage    Procedure completion:  Tolerated  Comments:      With the patient the sitting position the posterior aspect of the right shoulder is prepped and draped in sterile fashion.  The posterior aspect of the acromion was identified.  A point 2 cm inferior and medial from the acromion was marked.  A 25-gauge needle was inserted perpendicular to the skin aiming towards coracoid process.  There was negative aspiration.  Bupivacaine 0.5% mixed with triamcinolone 40 mg a total of 5 cc was injected.  Patient tolerated the procedure without a problem and was discharged home.

## 2024-07-18 ENCOUNTER — LAB (OUTPATIENT)
Dept: LAB | Facility: LAB | Age: 69
End: 2024-07-18
Payer: MEDICARE

## 2024-07-18 ENCOUNTER — APPOINTMENT (OUTPATIENT)
Dept: GASTROENTEROLOGY | Facility: CLINIC | Age: 69
End: 2024-07-18
Payer: MEDICARE

## 2024-07-18 VITALS
DIASTOLIC BLOOD PRESSURE: 84 MMHG | HEART RATE: 72 BPM | BODY MASS INDEX: 31.67 KG/M2 | OXYGEN SATURATION: 94 % | SYSTOLIC BLOOD PRESSURE: 157 MMHG | WEIGHT: 190.3 LBS

## 2024-07-18 DIAGNOSIS — K51.30 ULCERATIVE RECTOSIGMOIDITIS WITHOUT COMPLICATION (MULTI): ICD-10-CM

## 2024-07-18 DIAGNOSIS — K51.30 ULCERATIVE RECTOSIGMOIDITIS WITHOUT COMPLICATION (MULTI): Primary | ICD-10-CM

## 2024-07-18 DIAGNOSIS — K51.311 ULCERATIVE RECTOSIGMOIDITIS WITH RECTAL BLEEDING (MULTI): ICD-10-CM

## 2024-07-18 DIAGNOSIS — D64.9 ANEMIA, UNSPECIFIED TYPE: ICD-10-CM

## 2024-07-18 LAB
ERYTHROCYTE [DISTWIDTH] IN BLOOD BY AUTOMATED COUNT: 15.7 % (ref 11.5–14.5)
HCT VFR BLD AUTO: 42.4 % (ref 41–52)
HGB BLD-MCNC: 13.7 G/DL (ref 13.5–17.5)
MCH RBC QN AUTO: 28.4 PG (ref 26–34)
MCHC RBC AUTO-ENTMCNC: 32.3 G/DL (ref 32–36)
MCV RBC AUTO: 88 FL (ref 80–100)
NRBC BLD-RTO: 0 /100 WBCS (ref 0–0)
PLATELET # BLD AUTO: 280 X10*3/UL (ref 150–450)
RBC # BLD AUTO: 4.83 X10*6/UL (ref 4.5–5.9)
WBC # BLD AUTO: 6.7 X10*3/UL (ref 4.4–11.3)

## 2024-07-18 PROCEDURE — 1159F MED LIST DOCD IN RCRD: CPT | Performed by: INTERNAL MEDICINE

## 2024-07-18 PROCEDURE — 85027 COMPLETE CBC AUTOMATED: CPT

## 2024-07-18 PROCEDURE — 36415 COLL VENOUS BLD VENIPUNCTURE: CPT

## 2024-07-18 PROCEDURE — 1036F TOBACCO NON-USER: CPT | Performed by: INTERNAL MEDICINE

## 2024-07-18 PROCEDURE — 99214 OFFICE O/P EST MOD 30 MIN: CPT | Performed by: INTERNAL MEDICINE

## 2024-07-18 RX ORDER — MESALAMINE 1.2 G/1
2400 TABLET, DELAYED RELEASE ORAL DAILY
Qty: 180 TABLET | Refills: 3 | Status: SHIPPED | OUTPATIENT
Start: 2024-07-18 | End: 2025-07-18

## 2024-07-18 NOTE — PROGRESS NOTES
Chief Complaint: Shakir Arciniega is a 68 y.o. male who presents for Follow-up.  HPI  69 Yo male presenting for follow-up after emergency room visit in February 2024 for rectal bleed. PMH is significant for ulcerative rectosigmoiditis. Lab testing during his hospitalization indicated infection with norovirus as well. Patient completed a course of steroids and was taking daily mesalamine TID after discharge. Currently patient does not endorse reoccurring diarrhea or bleeds. Denies abdominal pain, nausea, vomiting, constipation, fever, or flu-like symptoms.  Review of Systems  12 Point ROS negative outside of symptoms stated above in HPI    Past Medical History:   Diagnosis Date    Ulcerative colitis (Multi)        Past Surgical History:   Procedure Laterality Date    KNEE SURGERY         No relevant family history has been documented for this patient.     reports that he has never smoked. He has never used smokeless tobacco. He reports that he does not currently use alcohol. He reports that he does not use drugs.    No Known Allergies    Imaging  No results found.      Laboratory  No results found for this or any previous visit (from the past 96 hour(s)).     Objective       Current Outpatient Medications:     pantoprazole (ProtoNix) 40 mg EC tablet, Take 1 tablet (40 mg) by mouth once daily., Disp: , Rfl:     tamsulosin (Flomax) 0.4 mg 24 hr capsule, Take 1 capsule (0.4 mg) by mouth once daily., Disp: , Rfl:     acetaminophen (Tylenol 8 Hour) 650 mg ER tablet, Take 2 tablets (1,300 mg) by mouth 2 times a day as needed for mild pain (1 - 3). Do not crush, chew, or split., Disp: 120 tablet, Rfl: 2    diclofenac sodium (Voltaren) 1 % gel gel, Apply 1 Application topically 4 times a day. Bilat knee (Patient not taking: Reported on 5/14/2024), Disp: 200 g, Rfl: 2    mesalamine (Lialda) 1.2 gram EC tablet, To be filled by Provider (Patient not taking: Reported on 7/18/2024), Disp: 270 tablet, Rfl: 0    Current  Facility-Administered Medications:     BUPivacaine HCl (Marcaine) 0.25 % (2.5 mg/mL) injection 1 mg, 1 mg, injection, Once, Jose Rodriguez MD    Last Recorded Vitals  Blood pressure 157/84, pulse 72, weight 86.3 kg (190 lb 4.8 oz), SpO2 94%.    Physical Exam  Constitutional:       General: He is not in acute distress.     Appearance: Normal appearance. He is not ill-appearing.   Cardiovascular:      Rate and Rhythm: Normal rate and regular rhythm.      Pulses: Normal pulses.      Heart sounds: Normal heart sounds. No murmur heard.  Pulmonary:      Effort: Pulmonary effort is normal. No respiratory distress.      Breath sounds: Normal breath sounds. No stridor. No wheezing or rhonchi.   Abdominal:      General: Bowel sounds are normal. There is no distension.      Palpations: Abdomen is soft. There is no mass.      Tenderness: There is no abdominal tenderness. There is no guarding.      Hernia: A hernia is present.      Comments: Chronic paraumbilical hernia, not tender   Neurological:      General: No focal deficit present.      Mental Status: He is alert and oriented to person, place, and time. Mental status is at baseline.         Assessment/Plan    #Ulcerative Rectosigmoiditis  #History of Norovirus infection and Rectal bleed  -Discharged after being admitted for rectal bleed  -Patient was tapered off a course of prednisone and remained on daily mesalamine therapy  -Denies any recent GI bleed, abdominal pain or diarrhea at this time.  Plan:  [ ] Continue Mesalamine dose  [ ] Will recheck CBC  [ ] Follow-up in 6 months with GI    Romero Leger DO  I saw and evaluated the patient. I personally obtained the key and critical portions of the history and physical exam or was physically present for key and critical portions performed by the resident. I reviewed the resident's documentation and discussed the patient with the resident. I agree with the resident's medical decision making as documented in the note.     Diagnosed on ulcerative proctosigmoiditis in 2019.  SP colonoscopy by me on 8/16/2023-ulcerative proctosigmoiditis in remission    Will continue maintenance dose of Lialda-2.4 g daily.

## 2024-07-25 DIAGNOSIS — K51.311 ULCERATIVE RECTOSIGMOIDITIS WITH RECTAL BLEEDING (MULTI): ICD-10-CM

## 2024-07-25 RX ORDER — MESALAMINE 1.2 G/1
3600 TABLET, DELAYED RELEASE ORAL DAILY
Qty: 270 TABLET | Refills: 3 | Status: SHIPPED | OUTPATIENT
Start: 2024-07-25 | End: 2025-07-25

## 2024-07-26 ENCOUNTER — TELEPHONE (OUTPATIENT)
Dept: GASTROENTEROLOGY | Facility: CLINIC | Age: 69
End: 2024-07-26
Payer: MEDICARE

## 2024-07-26 NOTE — TELEPHONE ENCOUNTER
----- Message from Nanette Starkey sent at 7/25/2024 11:47 PM EDT -----  Okay to take 3 tablets/day.  If still symptomatic with 3 tablets/day will switch to medication called Etrasimod (Velsipity). Script sent  ----- Message -----  From: Laura Freeman MA  Sent: 7/25/2024   9:46 AM EDT  To: Albert Contreras MA; Nanette Starkey MD    Patient said the [facility]al[facility] he is on [facility] that it is too expensive to get at a regular pharmacy. Patient also said you wanted him to lower his dose down to 2 instead of 3 tablets however now he is having symptoms again. He asked if he should go back up to 3?

## 2024-09-05 ENCOUNTER — APPOINTMENT (OUTPATIENT)
Dept: GASTROENTEROLOGY | Facility: CLINIC | Age: 69
End: 2024-09-05
Payer: MEDICARE

## 2024-09-06 ENCOUNTER — TELEPHONE (OUTPATIENT)
Dept: GASTROENTEROLOGY | Facility: CLINIC | Age: 69
End: 2024-09-06
Payer: MEDICARE

## 2024-09-06 NOTE — TELEPHONE ENCOUNTER
----- Message from Nanette Starkey sent at 9/2/2024  9:19 AM EDT -----  Good news.  Labs shows hemoglobin within normal limit.  Continue mesalamine (Lialda ).    Albert, please inform patient